# Patient Record
Sex: FEMALE | Race: WHITE | Employment: OTHER | URBAN - METROPOLITAN AREA
[De-identification: names, ages, dates, MRNs, and addresses within clinical notes are randomized per-mention and may not be internally consistent; named-entity substitution may affect disease eponyms.]

---

## 2020-10-12 PROBLEM — D12.6 POLYP OF COLON, ADENOMATOUS: Status: ACTIVE | Noted: 2017-01-02

## 2020-10-12 PROBLEM — L20.89 OTHER ATOPIC DERMATITIS: Status: ACTIVE | Noted: 2019-08-04

## 2020-10-13 ENCOUNTER — OFFICE VISIT (OUTPATIENT)
Dept: UROGYNECOLOGY | Age: 70
End: 2020-10-13
Payer: MEDICARE

## 2020-10-13 VITALS
OXYGEN SATURATION: 98 % | SYSTOLIC BLOOD PRESSURE: 146 MMHG | RESPIRATION RATE: 16 BRPM | TEMPERATURE: 97.1 F | DIASTOLIC BLOOD PRESSURE: 71 MMHG | HEART RATE: 57 BPM

## 2020-10-13 LAB
BILIRUBIN, POC: NORMAL
BLOOD URINE, POC: NORMAL
CLARITY, POC: CLEAR
COLOR, POC: YELLOW
GLUCOSE URINE, POC: NORMAL
KETONES, POC: NORMAL
LEUKOCYTE EST, POC: NORMAL
NITRITE, POC: NORMAL
PH, POC: 6.5
PROTEIN, POC: NORMAL
SPECIFIC GRAVITY, POC: 1.01
UROBILINOGEN, POC: NORMAL

## 2020-10-13 PROCEDURE — G8484 FLU IMMUNIZE NO ADMIN: HCPCS | Performed by: OBSTETRICS & GYNECOLOGY

## 2020-10-13 PROCEDURE — 81002 URINALYSIS NONAUTO W/O SCOPE: CPT | Performed by: OBSTETRICS & GYNECOLOGY

## 2020-10-13 PROCEDURE — G8421 BMI NOT CALCULATED: HCPCS | Performed by: OBSTETRICS & GYNECOLOGY

## 2020-10-13 PROCEDURE — 99204 OFFICE O/P NEW MOD 45 MIN: CPT | Performed by: OBSTETRICS & GYNECOLOGY

## 2020-10-13 PROCEDURE — 0509F URINE INCON PLAN DOCD: CPT | Performed by: OBSTETRICS & GYNECOLOGY

## 2020-10-13 PROCEDURE — 1090F PRES/ABSN URINE INCON ASSESS: CPT | Performed by: OBSTETRICS & GYNECOLOGY

## 2020-10-13 PROCEDURE — G8427 DOCREV CUR MEDS BY ELIG CLIN: HCPCS | Performed by: OBSTETRICS & GYNECOLOGY

## 2020-10-13 PROCEDURE — 51725 SIMPLE CYSTOMETROGRAM: CPT | Performed by: OBSTETRICS & GYNECOLOGY

## 2020-10-13 RX ORDER — LEVOTHYROXINE SODIUM 112 MCG
112 TABLET ORAL DAILY
COMMUNITY
Start: 2020-08-08

## 2020-10-13 RX ORDER — SPIRONOLACTONE 25 MG/1
25 TABLET ORAL 2 TIMES DAILY
COMMUNITY
Start: 2018-12-10 | End: 2021-08-10 | Stop reason: ALTCHOICE

## 2020-10-13 RX ORDER — BIMATOPROST 0.01 %
DROPS OPHTHALMIC (EYE)
COMMUNITY
Start: 2020-09-09

## 2020-10-13 RX ORDER — ESTRADIOL 10 UG/1
10 INSERT VAGINAL SEE ADMIN INSTRUCTIONS
COMMUNITY
Start: 2020-09-23

## 2020-10-13 RX ORDER — ESTRADIOL 0.05 MG/D
1 FILM, EXTENDED RELEASE TRANSDERMAL
COMMUNITY
Start: 2020-09-23

## 2020-10-13 ASSESSMENT — ENCOUNTER SYMPTOMS
CONSTIPATION: 1
SINUS PRESSURE: 1
ABDOMINAL DISTENTION: 1

## 2020-10-13 NOTE — PROGRESS NOTES
10/13/2020      HPI:     Name: Rosalio Ennis  YOB: 1950    CC: Patient is a 78 y.o. female who is seen in consultation from Digna Peguero MD   for evaluation of prolapse, stress incontinence , urge incontinence , constipation and pelvic pain. HPI:  Bladder control problem: yes, 8 months. 1-2 pads a day. 6-8 times to restroom during the day. Bladder emptying problems: No  Prolapse/Vaginal Support problems: yes, bulge. 8 months. Symptoms worse at the end of the day or after standing for prolonged periods. Bowel problem(s): yes, 8 months. Constipation. Bloating. Sometimes feels that bowels are never completely empty. Sexual History:  reports previously being sexually active and has had partner(s) who are Male. Pelvic Pain:  yes, pelvic area and vagina. Heat-stretching relieves the pain. Ob/Gyn History:    OB History   No obstetric history on file. Past Medical History:   Past Medical History:   Diagnosis Date    Anemia     Atopic dermatitis     Cystocele, midline     Dermatitis     Diverticulosis     Endometriosis     Essential hypertension     Female bladder prolapse     Hypertension     Hypothyroidism due to acquired atrophy of thyroid     Patellofemoral disorder of right knee     Polyp of colon, adenomatous     Postmenopausal atrophic vaginitis     Rectocele     SCC (squamous cell carcinoma), face     Skin cancer      Past Surgical History:   Past Surgical History:   Procedure Laterality Date    BUNIONECTOMY Left     EYE SURGERY Bilateral     SLT Laswer eye treatment    HYSTERECTOMY      OSTEOTOMY      FERNANDO AND BSO  1981    FERNANDO, RMVL tube, RMVL ovary with right oophorectomy    TONSILLECTOMY       Allergies:    Allergies   Allergen Reactions    Latex     Adhesive Tape Rash     Medical tape  Medical tape    Amoxicillin Nausea And Vomiting    Corn Oil Rash    Metronidazole Rash    Neomycin Rash    Polymyxin B Rash    Soy Allergy Rash     Current Medications:  Current Outpatient Medications   Medication Sig Dispense Refill    SYNTHROID 112 MCG tablet       estradiol (VIVELLE) 0.05 MG/24HR       Estradiol (VAGIFEM) 10 MCG TABS vaginal tablet       LUMIGAN 0.01 % SOLN ophthalmic drops INSTILL 1 DROP INTO BOTH EYES AT BEDTIME      spironolactone (ALDACTONE) 25 MG tablet Take 25 mg by mouth 2 times daily       No current facility-administered medications for this visit.       Social History:   Social History     Socioeconomic History    Marital status:      Spouse name: Not on file    Number of children: Not on file    Years of education: Not on file    Highest education level: Not on file   Occupational History    Occupation: retired    Social Needs    Financial resource strain: Not on file    Food insecurity     Worry: Not on file     Inability: Not on file   [employer] needs     Medical: Not on file     Non-medical: Not on file   Tobacco Use    Smoking status: Former Smoker     Last attempt to quit: 1980     Years since quittin.8    Smokeless tobacco: Never Used   Substance and Sexual Activity    Alcohol use: Yes     Comment: occaionally    Drug use: Never    Sexual activity: Not Currently     Partners: Male     Comment: due to prolapse and pain   Lifestyle    Physical activity     Days per week: Not on file     Minutes per session: Not on file    Stress: Not on file   Relationships    Social connections     Talks on phone: Not on file     Gets together: Not on file     Attends Sikh service: Not on file     Active member of club or organization: Not on file     Attends meetings of clubs or organizations: Not on file     Relationship status: Not on file    Intimate partner violence     Fear of current or ex partner: Not on file     Emotionally abused: Not on file     Physically abused: Not on file     Forced sexual activity: Not on file   Other Topics Concern    Not on file   Social History Narrative    Not on file     Family History:   Family History   Problem Relation Age of Onset    Colon Cancer Other     Stroke Other      Review of System  Review of Systems   Constitutional: Positive for activity change and fatigue. HENT: Positive for congestion, sinus pressure and tinnitus. Gastrointestinal: Positive for abdominal distention and constipation. Genitourinary: Positive for pelvic pain. Musculoskeletal: Positive for neck stiffness. Allergic/Immunologic: Positive for environmental allergies and food allergies. Objective:     Vital Signs  Vitals:    10/13/20 1033   BP: (!) 146/71   Pulse: 57   Resp: 16   Temp: 97.1 °F (36.2 °C)   SpO2: 98%     Physical Exam  Physical Exam  HENT:      Head: Normocephalic and atraumatic. Eyes:      Conjunctiva/sclera: Conjunctivae normal.   Neck:      Musculoskeletal: Normal range of motion and neck supple. Pulmonary:      Effort: Pulmonary effort is normal.   Abdominal:      Palpations: Abdomen is soft. Genitourinary:     Comments: Rectocele, cystocele, vaginal atrophy  Skin:     General: Skin is warm and dry. Neurological:      Mental Status: She is alert and oriented to person, place, and time. Office Fill Study/Urine Dip:     Using sterile technique a manometry catheter was placed. Patient's bladder was filled with sterile water by gravity. Capacity and storage volumes were measured. Spasms assessed. Catheter was removed. Stress urinary incontinence was assessed.     Results for POC orders placed in visit on 10/13/20   POCT Urinalysis no Micro   Result Value Ref Range    Color, UA yellow     Clarity, UA clear     Glucose, UA POC neg     Bilirubin, UA neg     Ketones, UA neg     Spec Grav, UA 1.015     Blood, UA POC neg     pH, UA 6.5     Protein, UA POC neg     Urobilinogen, UA neg     Leukocytes, UA neg     Nitrite, UA neg        PVR: 20 cc  1st Sensation: 30 cc  2nd Sensation: 150 cc  Max Sensation: 330 cc  Empty Cough Stress Test: negative  Full Cough Stress Test: positive  Spasms: No    POPQ and Pelvic Exam:     Anterior Wall (Aa): -1 Anterior Wall (Ba): -1 Cervix or Cuff (C): -6   Genital Haitus (gh): 4 Perineal body (pb): 2 Total Vaginal Length (tvl): 8   Posterior Wall (Ap): 0 Posterior Wall (Bp): 0      OXFORD SCALE MUSCLE STRENGTH=  4   / 5    Assessment/Plan:     Grace Hernandez is a 78 y.o. female with   1. Enuresis    2. Cystocele, midline    3. Rectocele    4. Vaginal atrophy      She has a cystocele and rectocele and significant vaginal atrophy. She has done a good job with her pelvic floor muscle exercises. [place] I did suggest that she try estrogen vaginal cream.  She has tried a pessary before with Dr. Paulette Rubin and this was not successful. I do not think I have any pessaries that would work appropriately for her at this time and I did explain this to her. I did give her handouts on vaginal estrogen pelvic organ prolapse and she is going to follow-up with me in approximately 6 months time. I did review vaginal repairs of her prolapse should she feel the need to move forward with this. She is also had stress incontinence on her test today however this has improved and I think this is probably related to the prolapse and the fact that she has done very well with her with her pelvic floor exercises. Orders Placed This Encounter   Procedures    POCT Urinalysis no Micro    MO SIMPLE CYSTOMETROGRAM     No orders of the defined types were placed in this encounter.       Urvashi Hernandez

## 2020-10-15 ENCOUNTER — TELEPHONE (OUTPATIENT)
Dept: UROGYNECOLOGY | Age: 70
End: 2020-10-15

## 2020-10-15 RX ORDER — CONJUGATED ESTROGENS 0.62 MG/G
CREAM VAGINAL
Qty: 42.5 G | Refills: 3 | Status: SHIPPED | OUTPATIENT
Start: 2020-10-15

## 2021-04-13 ENCOUNTER — OFFICE VISIT (OUTPATIENT)
Dept: UROGYNECOLOGY | Age: 71
End: 2021-04-13
Payer: MEDICARE

## 2021-04-13 VITALS
SYSTOLIC BLOOD PRESSURE: 169 MMHG | HEART RATE: 56 BPM | TEMPERATURE: 96.9 F | RESPIRATION RATE: 18 BRPM | DIASTOLIC BLOOD PRESSURE: 74 MMHG | OXYGEN SATURATION: 98 %

## 2021-04-13 DIAGNOSIS — N95.2 VAGINAL ATROPHY: ICD-10-CM

## 2021-04-13 DIAGNOSIS — N81.11 CYSTOCELE, MIDLINE: ICD-10-CM

## 2021-04-13 DIAGNOSIS — N81.6 RECTOCELE: ICD-10-CM

## 2021-04-13 DIAGNOSIS — R32 ENURESIS: Primary | ICD-10-CM

## 2021-04-13 PROCEDURE — G8427 DOCREV CUR MEDS BY ELIG CLIN: HCPCS | Performed by: OBSTETRICS & GYNECOLOGY

## 2021-04-13 PROCEDURE — 4040F PNEUMOC VAC/ADMIN/RCVD: CPT | Performed by: OBSTETRICS & GYNECOLOGY

## 2021-04-13 PROCEDURE — 99213 OFFICE O/P EST LOW 20 MIN: CPT | Performed by: OBSTETRICS & GYNECOLOGY

## 2021-04-13 PROCEDURE — 1090F PRES/ABSN URINE INCON ASSESS: CPT | Performed by: OBSTETRICS & GYNECOLOGY

## 2021-04-13 PROCEDURE — 3017F COLORECTAL CA SCREEN DOC REV: CPT | Performed by: OBSTETRICS & GYNECOLOGY

## 2021-04-13 PROCEDURE — 1036F TOBACCO NON-USER: CPT | Performed by: OBSTETRICS & GYNECOLOGY

## 2021-04-13 PROCEDURE — 1123F ACP DISCUSS/DSCN MKR DOCD: CPT | Performed by: OBSTETRICS & GYNECOLOGY

## 2021-04-13 PROCEDURE — G8421 BMI NOT CALCULATED: HCPCS | Performed by: OBSTETRICS & GYNECOLOGY

## 2021-04-13 PROCEDURE — G8400 PT W/DXA NO RESULTS DOC: HCPCS | Performed by: OBSTETRICS & GYNECOLOGY

## 2021-04-13 PROCEDURE — 0509F URINE INCON PLAN DOCD: CPT | Performed by: OBSTETRICS & GYNECOLOGY

## 2021-04-13 RX ORDER — KETOCONAZOLE 20 MG/G
CREAM TOPICAL DAILY PRN
COMMUNITY
Start: 2021-01-06

## 2021-04-13 ASSESSMENT — ENCOUNTER SYMPTOMS: BACK PAIN: 1

## 2021-04-13 NOTE — PROGRESS NOTES
4/13/2021       HPI:     Name: Kaylee Jimenez  YOB: 1950    CC: Patient is a 69 y.o. presenting for evaluation of prolapse. HPI: How long have you had this problem? Years  Please rate the severity of your problem: mild  Anything make it better? The creams and pelvic floor exercises help some. Ob/Gyn History:    OB History   No obstetric history on file. Past Medical History:   Past Medical History:   Diagnosis Date    Anemia     Atopic dermatitis     Cystocele, midline     Dermatitis     Diverticulosis     Endometriosis     Essential hypertension     Female bladder prolapse     Hypertension     Hypothyroidism due to acquired atrophy of thyroid     Patellofemoral disorder of right knee     Polyp of colon, adenomatous     Postmenopausal atrophic vaginitis     Rectocele     SCC (squamous cell carcinoma), face     Skin cancer      Past Surgical History:   Past Surgical History:   Procedure Laterality Date    BUNIONECTOMY Left     EYE SURGERY Bilateral     SLT Laswer eye treatment    HYSTERECTOMY      OSTEOTOMY      FERNANDO AND BSO  1981    FERNANDO, RMVL tube, RMVL ovary with right oophorectomy    TONSILLECTOMY       Allergies: Allergies   Allergen Reactions    Latex     Adhesive Tape Rash     Medical tape  Medical tape    Amoxicillin Nausea And Vomiting    Corn Oil Rash    Metronidazole Rash    Neomycin Rash    Polymyxin B Rash    Soy Allergy Rash     Current Medications:  Current Outpatient Medications   Medication Sig Dispense Refill    ketoconazole (NIZORAL) 2 % cream       Ergocalciferol 10 MCG (400 UNIT) TABS Take 1 tablet by mouth      conjugated estrogens (PREMARIN) 0.625 MG/GM vaginal cream Apply pea size manually to the vagina. 2 times per week.  42.5 g 3    SYNTHROID 112 MCG tablet       estradiol (VIVELLE) 0.05 MG/24HR       Estradiol (VAGIFEM) 10 MCG TABS vaginal tablet       LUMIGAN 0.01 % SOLN ophthalmic drops INSTILL 1 DROP INTO BOTH EYES AT BEDTIME  spironolactone (ALDACTONE) 25 MG tablet Take 25 mg by mouth 2 times daily       No current facility-administered medications for this visit. Social History:   Social History     Socioeconomic History    Marital status:      Spouse name: Not on file    Number of children: Not on file    Years of education: Not on file    Highest education level: Not on file   Occupational History    Occupation: retired    Social Needs    Financial resource strain: Not on file    Food insecurity     Worry: Not on file     Inability: Not on file   [employer] needs     Medical: Not on file     Non-medical: Not on file   Tobacco Use    Smoking status: Former Smoker     Quit date: 1980     Years since quittin.3    Smokeless tobacco: Never Used   Substance and Sexual Activity    Alcohol use: Yes     Comment: occaionally    Drug use: Never    Sexual activity: Not Currently     Partners: Male     Comment: due to prolapse and pain   Lifestyle    Physical activity     Days per week: Not on file     Minutes per session: Not on file    Stress: Not on file   Relationships    Social connections     Talks on phone: Not on file     Gets together: Not on file     Attends Congregation service: Not on file     Active member of club or organization: Not on file     Attends meetings of clubs or organizations: Not on file     Relationship status: Not on file    Intimate partner violence     Fear of current or ex partner: Not on file     Emotionally abused: Not on file     Physically abused: Not on file     Forced sexual activity: Not on file   Other Topics Concern    Not on file   Social History Narrative    Not on file     Family History:   Family History   Problem Relation Age of Onset    Colon Cancer Other     Stroke Other      Review of System   Review of Systems   Genitourinary: Positive for pelvic pain. Musculoskeletal: Positive for back pain.    Allergic/Immunologic: Positive for environmental allergies and food allergies. All other systems reviewed and are negative. A review of systems was done by the patient and reviewed by me and scanned into media today. Objective:     Vitals  Vitals:    04/13/21 0957   BP: (!) 169/74   Pulse: 56   Resp: 18   Temp: 96.9 °F (36.1 °C)   SpO2: 98%     Physical Exam  Physical Exam  HENT:      Head: Normocephalic and atraumatic. Eyes:      Conjunctiva/sclera: Conjunctivae normal.   Neck:      Musculoskeletal: Normal range of motion and neck supple. Pulmonary:      Effort: Pulmonary effort is normal.   Abdominal:      Palpations: Abdomen is soft. Genitourinary:     Comments: Slightly larger cystocele and rectocele and vault prolapse  Skin:     General: Skin is warm and dry. Neurological:      Mental Status: She is alert and oriented to person, place, and time. No results found for this visit on 04/13/21. Assessment/Plan:     Deedee Good is a 69 y.o. female with   1. Enuresis    2. Cystocele, midline    3. Rectocele    4. Vaginal atrophy      She is more bothered by the prolapse and wants to move towards surgery. I talked with her about a [facility] today based on her exam.  She will continue to use the estrogen cream.  No orders of the defined types were placed in this encounter. No orders of the defined types were placed in this encounter.       Randa Wright

## 2021-04-13 NOTE — LETTER
[address] [place] [address] Urogynecology  [facility] [address] [facility]  Phone: [phone]  Fax: [phone]    Mallory Pimentel MD        April 13, 2021      Dear Dr Kristofer Jacobsen,    I had the pleasure of seeing Ranjana Mukherjee back in the office today. She is think about having her prolapse fixed surgically and I will have her follow-up with me again in 3 months time. Sincerely,    Nilson Chanel MD   4/13/2021       HPI:     Name: Spencer Garvey  YOB: 1950    CC: Patient is a 69 y.o. presenting for evaluation of prolapse. HPI: How long have you had this problem? Years  Please rate the severity of your problem: mild  Anything make it better? The creams and pelvic floor exercises help some. Ob/Gyn History:    OB History   No obstetric history on file. Past Medical History:   Past Medical History:   Diagnosis Date    Anemia     Atopic dermatitis     Cystocele, midline     Dermatitis     Diverticulosis     Endometriosis     Essential hypertension     Female bladder prolapse     Hypertension     Hypothyroidism due to acquired atrophy of thyroid     Patellofemoral disorder of right knee     Polyp of colon, adenomatous     Postmenopausal atrophic vaginitis     Rectocele     SCC (squamous cell carcinoma), face     Skin cancer      Past Surgical History:   Past Surgical History:   Procedure Laterality Date    BUNIONECTOMY Left     EYE SURGERY Bilateral     SLT Laswer eye treatment    HYSTERECTOMY      OSTEOTOMY      FERNANDO AND BSO  1981    FERNANDO, RMVL tube, RMVL ovary with right oophorectomy    TONSILLECTOMY       Allergies:    Allergies   Allergen Reactions    Latex     Adhesive Tape Rash     Medical tape  Medical tape    Amoxicillin Nausea And Vomiting    Corn Oil Rash    Metronidazole Rash    Neomycin Rash    Polymyxin B Rash    Soy Allergy Rash     Current Medications:  Current Outpatient Medications   Medication Sig Dispense Refill    ketoconazole (NIZORAL) 2 % cream       Ergocalciferol 10 MCG (400 UNIT) TABS Take 1 tablet by mouth      conjugated estrogens (PREMARIN) 0.625 MG/GM vaginal cream Apply pea size manually to the vagina. 2 times per week. 42.5 g 3    SYNTHROID 112 MCG tablet       estradiol (VIVELLE) 0.05 MG/24HR       Estradiol (VAGIFEM) 10 MCG TABS vaginal tablet       LUMIGAN 0.01 % SOLN ophthalmic drops INSTILL 1 DROP INTO BOTH EYES AT BEDTIME      spironolactone (ALDACTONE) 25 MG tablet Take 25 mg by mouth 2 times daily       No current facility-administered medications for this visit.       Social History:   Social History     Socioeconomic History    Marital status:      Spouse name: Not on file    Number of children: Not on file    Years of education: Not on file    Highest education level: Not on file   Occupational History    Occupation: retired    Social Needs    Financial resource strain: Not on file    Food insecurity     Worry: Not on file     Inability: Not on file   [employer] needs     Medical: Not on file     Non-medical: Not on file   Tobacco Use    Smoking status: Former Smoker     Quit date: 1980     Years since quittin.3    Smokeless tobacco: Never Used   Substance and Sexual Activity    Alcohol use: Yes     Comment: occaionally    Drug use: Never    Sexual activity: Not Currently     Partners: Male     Comment: due to prolapse and pain   Lifestyle    Physical activity     Days per week: Not on file     Minutes per session: Not on file    Stress: Not on file   Relationships    Social connections     Talks on phone: Not on file     Gets together: Not on file     Attends [employer] service: Not on file     Active member of club or organization: Not on file     Attends meetings of clubs or organizations: Not on file     Relationship status: Not on file    Intimate partner violence     Fear of current or ex partner: Not on file     Emotionally abused: Not on file     Physically abused: Not on file     Forced sexual activity: Not on file   Other Topics Concern    Not on file   Social History Narrative    Not on file     Family History:   Family History   Problem Relation Age of Onset    Colon Cancer Other     Stroke Other      Review of System   Review of Systems   Genitourinary: Positive for pelvic pain. Musculoskeletal: Positive for back pain. Allergic/Immunologic: Positive for environmental allergies and food allergies. All other systems reviewed and are negative. A review of systems was done by the patient and reviewed by me and scanned into media today. Objective:     Vitals  Vitals:    04/13/21 0957   BP: (!) 169/74   Pulse: 56   Resp: 18   Temp: 96.9 °F (36.1 °C)   SpO2: 98%     Physical Exam  Physical Exam  HENT:      Head: Normocephalic and atraumatic. Eyes:      Conjunctiva/sclera: Conjunctivae normal.   Neck:      Musculoskeletal: Normal range of motion and neck supple. Pulmonary:      Effort: Pulmonary effort is normal.   Abdominal:      Palpations: Abdomen is soft. Genitourinary:     Comments: Slightly larger cystocele and rectocele and vault prolapse  Skin:     General: Skin is warm and dry. Neurological:      Mental Status: She is alert and oriented to person, place, and time. No results found for this visit on 04/13/21. Assessment/Plan:     Layo Ashford is a 69 y.o. female with   1. Enuresis    2. Cystocele, midline    3. Rectocele    4. Vaginal atrophy      She is more bothered by the prolapse and wants to move towards surgery. I talked with her about a [facility] today based on her exam.  She will continue to use the estrogen cream.  No orders of the defined types were placed in this encounter. No orders of the defined types were placed in this encounter.       Alfred Ruano

## 2021-07-13 ENCOUNTER — OFFICE VISIT (OUTPATIENT)
Dept: UROGYNECOLOGY | Age: 71
End: 2021-07-13
Payer: MEDICARE

## 2021-07-13 VITALS
DIASTOLIC BLOOD PRESSURE: 80 MMHG | HEART RATE: 78 BPM | TEMPERATURE: 97.9 F | RESPIRATION RATE: 14 BRPM | OXYGEN SATURATION: 96 % | SYSTOLIC BLOOD PRESSURE: 166 MMHG

## 2021-07-13 DIAGNOSIS — N39.3 STRESS INCONTINENCE: ICD-10-CM

## 2021-07-13 DIAGNOSIS — N81.11 CYSTOCELE, MIDLINE: ICD-10-CM

## 2021-07-13 DIAGNOSIS — N81.6 RECTOCELE: ICD-10-CM

## 2021-07-13 DIAGNOSIS — R32 ENURESIS: Primary | ICD-10-CM

## 2021-07-13 DIAGNOSIS — N95.2 VAGINAL ATROPHY: ICD-10-CM

## 2021-07-13 PROCEDURE — G8400 PT W/DXA NO RESULTS DOC: HCPCS | Performed by: OBSTETRICS & GYNECOLOGY

## 2021-07-13 PROCEDURE — 4040F PNEUMOC VAC/ADMIN/RCVD: CPT | Performed by: OBSTETRICS & GYNECOLOGY

## 2021-07-13 PROCEDURE — 0509F URINE INCON PLAN DOCD: CPT | Performed by: OBSTETRICS & GYNECOLOGY

## 2021-07-13 PROCEDURE — 1036F TOBACCO NON-USER: CPT | Performed by: OBSTETRICS & GYNECOLOGY

## 2021-07-13 PROCEDURE — 1123F ACP DISCUSS/DSCN MKR DOCD: CPT | Performed by: OBSTETRICS & GYNECOLOGY

## 2021-07-13 PROCEDURE — 1090F PRES/ABSN URINE INCON ASSESS: CPT | Performed by: OBSTETRICS & GYNECOLOGY

## 2021-07-13 PROCEDURE — 3017F COLORECTAL CA SCREEN DOC REV: CPT | Performed by: OBSTETRICS & GYNECOLOGY

## 2021-07-13 PROCEDURE — 99214 OFFICE O/P EST MOD 30 MIN: CPT | Performed by: OBSTETRICS & GYNECOLOGY

## 2021-07-13 PROCEDURE — G8427 DOCREV CUR MEDS BY ELIG CLIN: HCPCS | Performed by: OBSTETRICS & GYNECOLOGY

## 2021-07-13 PROCEDURE — 52285 CYSTOSCOPY AND TREATMENT: CPT | Performed by: OBSTETRICS & GYNECOLOGY

## 2021-07-13 PROCEDURE — G8421 BMI NOT CALCULATED: HCPCS | Performed by: OBSTETRICS & GYNECOLOGY

## 2021-07-13 RX ORDER — SODIUM CHLORIDE 0.9 % (FLUSH) 0.9 %
5-40 SYRINGE (ML) INJECTION EVERY 12 HOURS SCHEDULED
Status: CANCELLED | OUTPATIENT
Start: 2021-07-13

## 2021-07-13 RX ORDER — SODIUM CHLORIDE 9 MG/ML
25 INJECTION, SOLUTION INTRAVENOUS PRN
Status: CANCELLED | OUTPATIENT
Start: 2021-07-13

## 2021-07-13 RX ORDER — SODIUM CHLORIDE 0.9 % (FLUSH) 0.9 %
5-40 SYRINGE (ML) INJECTION PRN
Status: CANCELLED | OUTPATIENT
Start: 2021-07-13

## 2021-07-13 NOTE — PROGRESS NOTES
7/13/2021     HPI:     Name: Joan Wilkins  YOB: 1950    CC: Joan Wilkins is a 69 y.o. female presenting for an evaluation of prolapse. HPI: How long have you had this problem? Several months  Please rate the severity of your problem: moderate  Anything make it better? There have been no changes since last visit. She would like to move forward with surgery. Ob/Gyn History:    OB History   No obstetric history on file. Past Medical History:   Past Medical History:   Diagnosis Date    Anemia     Atopic dermatitis     Cystocele, midline     Dermatitis     Diverticulosis     Endometriosis     Essential hypertension     Female bladder prolapse     Hypertension     Hypothyroidism due to acquired atrophy of thyroid     Patellofemoral disorder of right knee     Polyp of colon, adenomatous     Postmenopausal atrophic vaginitis     Rectocele     SCC (squamous cell carcinoma), face     Skin cancer      Past Surgical History:   Past Surgical History:   Procedure Laterality Date    BUNIONECTOMY Left     EYE SURGERY Bilateral     SLT Laswer eye treatment    HYSTERECTOMY      OSTEOTOMY      FERNANDO AND BSO  1981    FERNANDO, RMVL tube, RMVL ovary with right oophorectomy    TONSILLECTOMY       Current Medications:  Current Outpatient Medications   Medication Sig Dispense Refill    ketoconazole (NIZORAL) 2 % cream       Ergocalciferol 10 MCG (400 UNIT) TABS Take 1 tablet by mouth      conjugated estrogens (PREMARIN) 0.625 MG/GM vaginal cream Apply pea size manually to the vagina. 2 times per week. 42.5 g 3    SYNTHROID 112 MCG tablet       estradiol (VIVELLE) 0.05 MG/24HR       Estradiol (VAGIFEM) 10 MCG TABS vaginal tablet       LUMIGAN 0.01 % SOLN ophthalmic drops INSTILL 1 DROP INTO BOTH EYES AT BEDTIME      spironolactone (ALDACTONE) 25 MG tablet Take 25 mg by mouth 2 times daily       No current facility-administered medications for this visit. Allergies:    Allergies Allergen Reactions    Latex     Adhesive Tape Rash     Medical tape  Medical tape    Amoxicillin Nausea And Vomiting    Corn Oil Rash    Metronidazole Rash    Neomycin Rash    Polymyxin B Rash    Soy Allergy Rash     Social History:   Social History     Socioeconomic History    Marital status:      Spouse name: Not on file    Number of children: Not on file    Years of education: Not on file    Highest education level: Not on file   Occupational History    Occupation: retired    Tobacco Use    Smoking status: Former Smoker     Quit date: 1980     Years since quittin.5    Smokeless tobacco: Never Used   Vaping Use    Vaping Use: Never used   Substance and Sexual Activity    Alcohol use: Yes     Comment: occaionally    Drug use: Never    Sexual activity: Not Currently     Partners: Male     Comment: due to prolapse and pain   Other Topics Concern    Not on file   Social History Narrative    Not on file     Social Determinants of Health     Financial Resource Strain:     Difficulty of Paying Living Expenses:    Food Insecurity:     Worried About [employer] in the Last Year:     [address] in the Last Year:    Transportation Needs:     Lack of Transportation (Medical):      Lack of Transportation (Non-Medical):    Physical Activity:     Days of Exercise per Week:     Minutes of Exercise per Session:    Stress:     Feeling of Stress :    Social Connections:     Frequency of Communication with Friends and Family:     Frequency of Social Gatherings with Friends and Family:     Attends [employer] Services:     Active Member of Clubs or Organizations:     Attends Club or Organization Meetings:     Marital Status:    Intimate Partner Violence:     Fear of Current or Ex-Partner:     Emotionally Abused:     Physically Abused:     Sexually Abused:      Family History:   Family History   Problem Relation Age of Onset    Colon Cancer Other     Stroke Other Review of System  Review of Systems   HENT: Positive for tinnitus. Genitourinary: Positive for pelvic pain. Allergic/Immunologic: Positive for environmental allergies and food allergies. All other systems reviewed and are negative. A review of systems was done by the patient and reviewed by me and scanned into media today. Objective:     Vital Signs  Vitals:    07/13/21 0959   BP: (!) 166/80   Pulse: 78   Resp: 14   Temp: 97.9 °F (36.6 °C)   SpO2: 96%      Physical Exam  Physical Exam  HENT:      Head: Normocephalic and atraumatic. Eyes:      Conjunctiva/sclera: Conjunctivae normal.   Pulmonary:      Effort: Pulmonary effort is normal.   Abdominal:      Palpations: Abdomen is soft. Genitourinary:     Comments: Cystocele, rectocele, vault prolapse, enterocele  Musculoskeletal:      Cervical back: Normal range of motion and neck supple. Skin:     General: Skin is warm and dry. Neurological:      Mental Status: She is alert and oriented to person, place, and time. Procedure: The urethral was anesthesized with topical lidocaine jelly and dilated to a #14 Luxembourgish. A 0-degree urethroscope was used to examine the urethra. A 70-degree cystoscope was used to evaluate the bladder. FINDINGS:  1. Urethra was normal  2. Bladder had trabeculations mild  3. Trigone appeared Normal  4. Ureters illustrated bilateral efflux  5. Patient exhibited spasmsd uring the study no    Cough stress test: Bladder filled to 250 mL. Patient did leak with cough stress test.    No results found for this visit on 07/13/21. Assessment/Plan:     Prem Jarrett is a 69 y.o. female with   1. Enuresis    2. Cystocele, midline    3. Rectocele    4. Vaginal atrophy    5. Stress incontinence      The patient was counseled on surgical and non-surgical options. The patient elected to move forward with surgery.   The risks and benefits of surgery including but not limited to bleeding, infection, injury to bowel, bladder, ureter, or other internal organs, transfusion, pain, bowel dysfunction, urinary incontinence, and sexual dysfunction were discussed at length. All questions were answered to the patients satisfaction. The patient elected to undergo bilateral salpingectomy, posterior repair, robotic sacral colpopexy, synthetic mid-urethral sling and cystourethroscopy. The risk and benefits of synthetic material, including mesh, were explained to the patient and all questions were answered. preop labs were ordered  Her  was present for the discussion today. Orders Placed This Encounter   Procedures    Initiate PAT Protocol     Standing Status:   Future     Standing Expiration Date:   9/11/2021    AK CYSTOSCOPY,RX FEMALE URETHRAL SYND     No orders of the defined types were placed in this encounter.       Justin Gupta MD

## 2021-07-16 PROBLEM — M25.519 SHOULDER PAIN: Status: ACTIVE | Noted: 2021-07-16

## 2021-07-16 PROBLEM — R93.89 ABNORMAL X-RAY EXAMINATION: Status: ACTIVE | Noted: 2021-07-16

## 2021-07-16 PROBLEM — Z01.818 PRE-OP TESTING: Status: ACTIVE | Noted: 2021-07-16

## 2021-07-16 PROBLEM — D12.6 ADENOMATOUS POLYP OF COLON: Status: ACTIVE | Noted: 2017-01-02

## 2021-07-16 RX ORDER — CLINDAMYCIN PHOSPHATE 20 MG/G
1 CREAM VAGINAL NIGHTLY
COMMUNITY
Start: 2021-02-12 | End: 2021-08-10 | Stop reason: ALTCHOICE

## 2021-07-16 RX ORDER — CHLORAL HYDRATE 500 MG
1000 CAPSULE ORAL DAILY
COMMUNITY

## 2021-07-16 RX ORDER — FAMOTIDINE 20 MG
1 TABLET ORAL
COMMUNITY

## 2021-07-16 RX ORDER — IVERMECTIN 10 MG/G
CREAM TOPICAL DAILY PRN
COMMUNITY
Start: 2021-05-18

## 2021-07-16 RX ORDER — CLINDAMYCIN PHOSPHATE 20 MG/G
CREAM VAGINAL
COMMUNITY
Start: 2021-05-14 | End: 2021-08-10 | Stop reason: ALTCHOICE

## 2021-07-21 ENCOUNTER — TELEPHONE (OUTPATIENT)
Dept: UROGYNECOLOGY | Age: 71
End: 2021-07-21

## 2021-07-21 NOTE — TELEPHONE ENCOUNTER
Surgery scheduled for 8/16/21. Patient called and  Questions regarding surgery, medications, or post operative care at this time have been answered.

## 2021-08-03 ENCOUNTER — TELEPHONE (OUTPATIENT)
Dept: UROGYNECOLOGY | Age: 71
End: 2021-08-03

## 2021-08-05 ENCOUNTER — TELEPHONE (OUTPATIENT)
Dept: UROGYNECOLOGY | Age: 71
End: 2021-08-05

## 2021-08-10 RX ORDER — PIMECROLIMUS 10 MG/G
CREAM TOPICAL 2 TIMES DAILY
COMMUNITY

## 2021-08-10 NOTE — PROGRESS NOTES
[address] [facility] time____0600________        Surgery time__0730__________    Take the following medications with a sip of water: Follow your MD/Surgeons pre-procedure instructions regarding your medications    Do not eat or drink anything after 12:00 midnight prior to your surgery. This includes water chewing gum, mints and ice chips. You may brush your teeth and gargle the morning of your surgery, but do not swallow the water     Please see your family doctor/pediatrician for a history and physical and/or concerning medications. Bring any test results/reports from your physicians office. If you are under the care of a heart doctor or specialist doctor, please be aware that you may be asked to them for clearance    You may be asked to stop blood thinners such as Coumadin, Plavix, Fragmin, Lovenox, etc., or any anti-inflammatories such as:  Aspirin, Ibuprofen, Advil, Naproxen prior to your surgery. We also ask that you stop any OTC medications such as fish oil, vitamin E, glucosamine, garlic, Multivitamins, COQ 10, etc.    We ask that you do not smoke 24 hours prior to surgery  We ask that you do not  drink any alcoholic beverages 24 hours prior to surgery     You must make arrangements for a responsible adult to take you home after your surgery. For your safety you will not be allowed to leave alone or drive yourself home. Your surgery will be cancelled if you do not have a ride home. Also for your safety, it is strongly suggested that someone stay with you the first 24 hours after your surgery. A parent or legal guardian must accompany a child scheduled for surgery and plan to stay at the hospital until the child is discharged. Please do not bring other children with you. For your comfort, please wear simple loose fitting clothing to the hospital.  Please do not bring valuables.     Do not wear any make-up or nail polish on your fingers or toes      For your safety, please do not wear any jewelry or body piercing's on the day of surgery. All jewelry must be removed. If you have dentures, they will be removed before going to operating room. For your convenience, we will provide you with a container. If you wear contact lenses or glasses, they will be removed, please bring a case for them. If you have a living will and a durable power of  for healthcare, please bring in a copy. As part of our patient safety program to minimize surgical site infections, we ask you to do the following:    · Please notify your surgeon if you develop any illness between         now and the  day of your surgery. · This includes a cough, cold, fever, sore throat, nausea,         or vomiting, and diarrhea, etc.  ·  Please notify your surgeon if you experience dizziness, shortness         of breath or blurred vision between now and the time of your surgery. Do not shave your operative site 96 hours prior to surgery. For face and neck surgery, men may use an electric razor 48 hours   prior to surgery. You may shower the night before surgery or the morning of   your surgery with an antibacterial soap. You will need to bring a photo ID and insurance card    [facility] has an onsite pharmacy, would you like to utilize our pharmacy     If you will be staying overnight and use a C-pap machine, please bring   your C-pap to hospital     Our goal is to provide you with excellent care, therefore, visitors will be limited to two(2) in the room at a time so that we may focus on providing this care for you. Please contact pre-admission testing if you have any further questions. [facility] phone number:  [address] [facility] fax number:  [phone]  Please note these are generalized instructions for all surgical cases, you may be provided with more specific instructions according to your surgery.

## 2021-08-10 NOTE — PROGRESS NOTES
Phone message left to call PAT dept at [phone]  for history review and surgery instructions on 8/10/21 @ 1982

## 2021-08-10 NOTE — PROGRESS NOTES
C-Difficile admission screening and protocol:     * Admitted with diarrhea? YES____    NO___X__     *Prior history of C-Diff. In last 3 months? YES____   NO_X____     *Antibiotic use in the past 6-8 weeks? NO______YES__X____                 If yes which  ANTIBIOTIC AND REASON______     *Prior hospitalization or nursing home in the last month?  YES____   NO__X__

## 2021-08-10 NOTE — PROGRESS NOTES
Preoperative Screening for Elective Surgery/Invasive Procedures While COVID-19 present in the community     Have you tested positive or have been t old to self-isolate for COVID-19 like symptoms within the past 28 days? Pt to bring covid vaccine card DOS   Do you currently have any of the following symptoms? NO  o Fever >100.0 F or 99.9 F in immunocompromised patients? o New onset cough, shortness of breath or difficulty breathing?  o New onset sore throat, myalgia (muscle aches and pains), headache, loss of taste/smell or diarrhea?  Have you had a potential exposure to COVID-19 within the past 14 days by: NO  o Close contact with a confirmed case? o Close contact with a healthcare worker,  or essential infrastructure worker (grocery store, [employer], gas station, public utilities or transportation)? o Do you reside in a congregate setting such as; skilled nursing facility, adult home, correctional facility, homeless shelter or other institutional setting?  o Have you had recent travel to a known COVID-19 hotspot? Indicate if the patient has a positive screen by answering yes to one or more of the above questions. Patients who test positive or screen positive prior to surgery or on the day of surgery should be evaluated in conjunction with the surgeon/proceduralist/anesthesiologist to determine the urgency of the procedure.

## 2021-08-13 ENCOUNTER — ANESTHESIA EVENT (OUTPATIENT)
Dept: OPERATING ROOM | Age: 71
DRG: 747 | End: 2021-08-13
Payer: MEDICARE

## 2021-08-13 ENCOUNTER — TELEPHONE (OUTPATIENT)
Dept: UROGYNECOLOGY | Age: 71
End: 2021-08-13

## 2021-08-15 PROBLEM — Z01.818 PRE-OP TESTING: Status: RESOLVED | Noted: 2021-07-16 | Resolved: 2021-08-15

## 2021-08-16 ENCOUNTER — ANESTHESIA (OUTPATIENT)
Dept: OPERATING ROOM | Age: 71
DRG: 747 | End: 2021-08-16
Payer: MEDICARE

## 2021-08-16 ENCOUNTER — HOSPITAL ENCOUNTER (INPATIENT)
Age: 71
LOS: 1 days | Discharge: HOME | DRG: 747 | End: 2021-08-17
Attending: OBSTETRICS & GYNECOLOGY | Admitting: OBSTETRICS & GYNECOLOGY
Payer: MEDICARE

## 2021-08-16 VITALS
DIASTOLIC BLOOD PRESSURE: 71 MMHG | OXYGEN SATURATION: 97 % | RESPIRATION RATE: 5 BRPM | TEMPERATURE: 95.4 F | SYSTOLIC BLOOD PRESSURE: 163 MMHG

## 2021-08-16 DIAGNOSIS — G89.18 POSTOPERATIVE PAIN: Primary | ICD-10-CM

## 2021-08-16 PROBLEM — N81.9 VAGINAL VAULT PROLAPSE: Status: ACTIVE | Noted: 2021-08-16

## 2021-08-16 PROCEDURE — 2709999900 HC NON-CHARGEABLE SUPPLY: Performed by: OBSTETRICS & GYNECOLOGY

## 2021-08-16 PROCEDURE — C1781 MESH (IMPLANTABLE): HCPCS | Performed by: OBSTETRICS & GYNECOLOGY

## 2021-08-16 PROCEDURE — 2500000003 HC RX 250 WO HCPCS

## 2021-08-16 PROCEDURE — 6360000002 HC RX W HCPCS: Performed by: OBSTETRICS & GYNECOLOGY

## 2021-08-16 PROCEDURE — 3700000001 HC ADD 15 MINUTES (ANESTHESIA): Performed by: OBSTETRICS & GYNECOLOGY

## 2021-08-16 PROCEDURE — 3600000009 HC SURGERY ROBOT BASE: Performed by: OBSTETRICS & GYNECOLOGY

## 2021-08-16 PROCEDURE — 2580000003 HC RX 258: Performed by: ANESTHESIOLOGY

## 2021-08-16 PROCEDURE — 6360000002 HC RX W HCPCS: Performed by: ANESTHESIOLOGY

## 2021-08-16 PROCEDURE — 6370000000 HC RX 637 (ALT 250 FOR IP): Performed by: OBSTETRICS & GYNECOLOGY

## 2021-08-16 PROCEDURE — 7100000000 HC PACU RECOVERY - FIRST 15 MIN: Performed by: OBSTETRICS & GYNECOLOGY

## 2021-08-16 PROCEDURE — 94150 VITAL CAPACITY TEST: CPT

## 2021-08-16 PROCEDURE — 58999 UNLISTED PX FML GENITAL SYS: CPT | Performed by: OBSTETRICS & GYNECOLOGY

## 2021-08-16 PROCEDURE — 0TQD8ZZ REPAIR URETHRA, VIA NATURAL OR ARTIFICIAL OPENING ENDOSCOPIC: ICD-10-PCS | Performed by: OBSTETRICS & GYNECOLOGY

## 2021-08-16 PROCEDURE — 3700000000 HC ANESTHESIA ATTENDED CARE: Performed by: OBSTETRICS & GYNECOLOGY

## 2021-08-16 PROCEDURE — 2580000003 HC RX 258: Performed by: OBSTETRICS & GYNECOLOGY

## 2021-08-16 PROCEDURE — 6360000002 HC RX W HCPCS: Performed by: NURSE ANESTHETIST, CERTIFIED REGISTERED

## 2021-08-16 PROCEDURE — 2700000000 HC OXYGEN THERAPY PER DAY

## 2021-08-16 PROCEDURE — 57425 LAPAROSCOPY SURG COLPOPEXY: CPT | Performed by: OBSTETRICS & GYNECOLOGY

## 2021-08-16 PROCEDURE — S2900 ROBOTIC SURGICAL SYSTEM: HCPCS | Performed by: OBSTETRICS & GYNECOLOGY

## 2021-08-16 PROCEDURE — 8E0W4CZ ROBOTIC ASSISTED PROCEDURE OF TRUNK REGION, PERCUTANEOUS ENDOSCOPIC APPROACH: ICD-10-PCS | Performed by: OBSTETRICS & GYNECOLOGY

## 2021-08-16 PROCEDURE — 2500000003 HC RX 250 WO HCPCS: Performed by: OBSTETRICS & GYNECOLOGY

## 2021-08-16 PROCEDURE — 2500000003 HC RX 250 WO HCPCS: Performed by: NURSE ANESTHETIST, CERTIFIED REGISTERED

## 2021-08-16 PROCEDURE — 0USG4ZZ REPOSITION VAGINA, PERCUTANEOUS ENDOSCOPIC APPROACH: ICD-10-PCS | Performed by: OBSTETRICS & GYNECOLOGY

## 2021-08-16 PROCEDURE — 0TSD4ZZ REPOSITION URETHRA, PERCUTANEOUS ENDOSCOPIC APPROACH: ICD-10-PCS | Performed by: OBSTETRICS & GYNECOLOGY

## 2021-08-16 PROCEDURE — 0UQF4ZZ REPAIR CUL-DE-SAC, PERCUTANEOUS ENDOSCOPIC APPROACH: ICD-10-PCS | Performed by: OBSTETRICS & GYNECOLOGY

## 2021-08-16 PROCEDURE — 7100000001 HC PACU RECOVERY - ADDTL 15 MIN: Performed by: OBSTETRICS & GYNECOLOGY

## 2021-08-16 PROCEDURE — C1771 REP DEV, URINARY, W/SLING: HCPCS | Performed by: OBSTETRICS & GYNECOLOGY

## 2021-08-16 PROCEDURE — 2720000010 HC SURG SUPPLY STERILE: Performed by: OBSTETRICS & GYNECOLOGY

## 2021-08-16 PROCEDURE — 57260 CMBN ANT PST COLPRHY: CPT | Performed by: OBSTETRICS & GYNECOLOGY

## 2021-08-16 PROCEDURE — 3600000019 HC SURGERY ROBOT ADDTL 15MIN: Performed by: OBSTETRICS & GYNECOLOGY

## 2021-08-16 PROCEDURE — 0JQC0ZZ REPAIR PELVIC REGION SUBCUTANEOUS TISSUE AND FASCIA, OPEN APPROACH: ICD-10-PCS | Performed by: OBSTETRICS & GYNECOLOGY

## 2021-08-16 PROCEDURE — 0WQN0ZZ REPAIR FEMALE PERINEUM, OPEN APPROACH: ICD-10-PCS | Performed by: OBSTETRICS & GYNECOLOGY

## 2021-08-16 PROCEDURE — 94761 N-INVAS EAR/PLS OXIMETRY MLT: CPT

## 2021-08-16 PROCEDURE — 1200000000 HC SEMI PRIVATE

## 2021-08-16 PROCEDURE — 57288 REPAIR BLADDER DEFECT: CPT | Performed by: OBSTETRICS & GYNECOLOGY

## 2021-08-16 DEVICE — SUPRAPUBIC MID-URETHRAL SLING
Type: IMPLANTABLE DEVICE | Site: VAGINA | Status: FUNCTIONAL
Brand: LYNX™ BLUE SYSTEM

## 2021-08-16 DEVICE — POLYPROPYLENE MESH FOR SACROCOLPOSUSPENSION/SACROCOLPOPEXY - M
Type: IMPLANTABLE DEVICE | Site: ABDOMEN | Status: FUNCTIONAL
Brand: RESTORELLE

## 2021-08-16 RX ORDER — GLYCOPYRROLATE 0.2 MG/ML
INJECTION INTRAMUSCULAR; INTRAVENOUS PRN
Status: DISCONTINUED | OUTPATIENT
Start: 2021-08-16 | End: 2021-08-16 | Stop reason: SDUPTHER

## 2021-08-16 RX ORDER — OXYCODONE HYDROCHLORIDE AND ACETAMINOPHEN 5; 325 MG/1; MG/1
2 TABLET ORAL PRN
Status: DISCONTINUED | OUTPATIENT
Start: 2021-08-16 | End: 2021-08-16 | Stop reason: HOSPADM

## 2021-08-16 RX ORDER — OXYCODONE HYDROCHLORIDE AND ACETAMINOPHEN 5; 325 MG/1; MG/1
1 TABLET ORAL PRN
Status: DISCONTINUED | OUTPATIENT
Start: 2021-08-16 | End: 2021-08-16 | Stop reason: HOSPADM

## 2021-08-16 RX ORDER — ONDANSETRON 2 MG/ML
4 INJECTION INTRAMUSCULAR; INTRAVENOUS
Status: COMPLETED | OUTPATIENT
Start: 2021-08-16 | End: 2021-08-16

## 2021-08-16 RX ORDER — KETOROLAC TROMETHAMINE 15 MG/ML
15 INJECTION, SOLUTION INTRAMUSCULAR; INTRAVENOUS EVERY 6 HOURS
Status: COMPLETED | OUTPATIENT
Start: 2021-08-16 | End: 2021-08-17

## 2021-08-16 RX ORDER — ONDANSETRON 2 MG/ML
4 INJECTION INTRAMUSCULAR; INTRAVENOUS EVERY 6 HOURS PRN
Status: DISCONTINUED | OUTPATIENT
Start: 2021-08-16 | End: 2021-08-17 | Stop reason: HOSPADM

## 2021-08-16 RX ORDER — SODIUM CHLORIDE 9 MG/ML
25 INJECTION, SOLUTION INTRAVENOUS PRN
Status: DISCONTINUED | OUTPATIENT
Start: 2021-08-16 | End: 2021-08-16 | Stop reason: SDUPTHER

## 2021-08-16 RX ORDER — OXYCODONE HYDROCHLORIDE AND ACETAMINOPHEN 5; 325 MG/1; MG/1
2 TABLET ORAL EVERY 4 HOURS PRN
Status: DISCONTINUED | OUTPATIENT
Start: 2021-08-16 | End: 2021-08-17 | Stop reason: HOSPADM

## 2021-08-16 RX ORDER — SODIUM CHLORIDE 0.9 % (FLUSH) 0.9 %
5-40 SYRINGE (ML) INJECTION PRN
Status: DISCONTINUED | OUTPATIENT
Start: 2021-08-16 | End: 2021-08-16 | Stop reason: SDUPTHER

## 2021-08-16 RX ORDER — SODIUM CHLORIDE 9 MG/ML
INJECTION, SOLUTION INTRAVENOUS CONTINUOUS
Status: DISCONTINUED | OUTPATIENT
Start: 2021-08-16 | End: 2021-08-17 | Stop reason: HOSPADM

## 2021-08-16 RX ORDER — PROMETHAZINE HYDROCHLORIDE 25 MG/1
12.5 TABLET ORAL EVERY 6 HOURS PRN
Status: DISCONTINUED | OUTPATIENT
Start: 2021-08-16 | End: 2021-08-17 | Stop reason: HOSPADM

## 2021-08-16 RX ORDER — SODIUM CHLORIDE 9 MG/ML
INJECTION, SOLUTION INTRAVENOUS CONTINUOUS
Status: DISCONTINUED | OUTPATIENT
Start: 2021-08-16 | End: 2021-08-16

## 2021-08-16 RX ORDER — MORPHINE SULFATE 2 MG/ML
1 INJECTION, SOLUTION INTRAMUSCULAR; INTRAVENOUS EVERY 5 MIN PRN
Status: DISCONTINUED | OUTPATIENT
Start: 2021-08-16 | End: 2021-08-16 | Stop reason: HOSPADM

## 2021-08-16 RX ORDER — BUPIVACAINE HYDROCHLORIDE 5 MG/ML
INJECTION, SOLUTION EPIDURAL; INTRACAUDAL
Status: COMPLETED | OUTPATIENT
Start: 2021-08-16 | End: 2021-08-16

## 2021-08-16 RX ORDER — SODIUM CHLORIDE 9 MG/ML
25 INJECTION, SOLUTION INTRAVENOUS PRN
Status: DISCONTINUED | OUTPATIENT
Start: 2021-08-16 | End: 2021-08-16 | Stop reason: HOSPADM

## 2021-08-16 RX ORDER — LATANOPROST 50 UG/ML
1 SOLUTION/ DROPS OPHTHALMIC NIGHTLY
Status: DISCONTINUED | OUTPATIENT
Start: 2021-08-16 | End: 2021-08-17 | Stop reason: HOSPADM

## 2021-08-16 RX ORDER — SODIUM CHLORIDE 0.9 % (FLUSH) 0.9 %
5-40 SYRINGE (ML) INJECTION EVERY 12 HOURS SCHEDULED
Status: DISCONTINUED | OUTPATIENT
Start: 2021-08-16 | End: 2021-08-17 | Stop reason: HOSPADM

## 2021-08-16 RX ORDER — LIDOCAINE HYDROCHLORIDE 20 MG/ML
INJECTION, SOLUTION EPIDURAL; INFILTRATION; INTRACAUDAL; PERINEURAL PRN
Status: DISCONTINUED | OUTPATIENT
Start: 2021-08-16 | End: 2021-08-16 | Stop reason: SDUPTHER

## 2021-08-16 RX ORDER — SODIUM CHLORIDE 0.9 % (FLUSH) 0.9 %
5-40 SYRINGE (ML) INJECTION EVERY 12 HOURS SCHEDULED
Status: DISCONTINUED | OUTPATIENT
Start: 2021-08-16 | End: 2021-08-16 | Stop reason: SDUPTHER

## 2021-08-16 RX ORDER — MORPHINE SULFATE 2 MG/ML
2 INJECTION, SOLUTION INTRAMUSCULAR; INTRAVENOUS EVERY 5 MIN PRN
Status: DISCONTINUED | OUTPATIENT
Start: 2021-08-16 | End: 2021-08-16 | Stop reason: HOSPADM

## 2021-08-16 RX ORDER — ONDANSETRON 2 MG/ML
INJECTION INTRAMUSCULAR; INTRAVENOUS PRN
Status: DISCONTINUED | OUTPATIENT
Start: 2021-08-16 | End: 2021-08-16 | Stop reason: SDUPTHER

## 2021-08-16 RX ORDER — SODIUM CHLORIDE 9 MG/ML
25 INJECTION, SOLUTION INTRAVENOUS PRN
Status: DISCONTINUED | OUTPATIENT
Start: 2021-08-16 | End: 2021-08-17 | Stop reason: HOSPADM

## 2021-08-16 RX ORDER — SODIUM CHLORIDE 0.9 % (FLUSH) 0.9 %
10 SYRINGE (ML) INJECTION PRN
Status: DISCONTINUED | OUTPATIENT
Start: 2021-08-16 | End: 2021-08-16 | Stop reason: HOSPADM

## 2021-08-16 RX ORDER — HYDRALAZINE HYDROCHLORIDE 20 MG/ML
5 INJECTION INTRAMUSCULAR; INTRAVENOUS
Status: DISCONTINUED | OUTPATIENT
Start: 2021-08-16 | End: 2021-08-16 | Stop reason: HOSPADM

## 2021-08-16 RX ORDER — MEPERIDINE HYDROCHLORIDE 25 MG/ML
12.5 INJECTION INTRAMUSCULAR; INTRAVENOUS; SUBCUTANEOUS EVERY 5 MIN PRN
Status: DISCONTINUED | OUTPATIENT
Start: 2021-08-16 | End: 2021-08-16 | Stop reason: HOSPADM

## 2021-08-16 RX ORDER — DEXAMETHASONE SODIUM PHOSPHATE 4 MG/ML
INJECTION, SOLUTION INTRA-ARTICULAR; INTRALESIONAL; INTRAMUSCULAR; INTRAVENOUS; SOFT TISSUE PRN
Status: DISCONTINUED | OUTPATIENT
Start: 2021-08-16 | End: 2021-08-16 | Stop reason: SDUPTHER

## 2021-08-16 RX ORDER — LEVOTHYROXINE SODIUM 112 UG/1
112 TABLET ORAL DAILY
Status: DISCONTINUED | OUTPATIENT
Start: 2021-08-16 | End: 2021-08-17 | Stop reason: HOSPADM

## 2021-08-16 RX ORDER — ROCURONIUM BROMIDE 10 MG/ML
INJECTION, SOLUTION INTRAVENOUS PRN
Status: DISCONTINUED | OUTPATIENT
Start: 2021-08-16 | End: 2021-08-16 | Stop reason: SDUPTHER

## 2021-08-16 RX ORDER — OXYCODONE HYDROCHLORIDE AND ACETAMINOPHEN 5; 325 MG/1; MG/1
1 TABLET ORAL EVERY 4 HOURS PRN
Status: DISCONTINUED | OUTPATIENT
Start: 2021-08-16 | End: 2021-08-17 | Stop reason: HOSPADM

## 2021-08-16 RX ORDER — FENTANYL CITRATE 50 UG/ML
INJECTION, SOLUTION INTRAMUSCULAR; INTRAVENOUS PRN
Status: DISCONTINUED | OUTPATIENT
Start: 2021-08-16 | End: 2021-08-16 | Stop reason: SDUPTHER

## 2021-08-16 RX ORDER — SODIUM CHLORIDE 0.9 % (FLUSH) 0.9 %
10 SYRINGE (ML) INJECTION EVERY 12 HOURS SCHEDULED
Status: DISCONTINUED | OUTPATIENT
Start: 2021-08-16 | End: 2021-08-16 | Stop reason: HOSPADM

## 2021-08-16 RX ORDER — MAGNESIUM HYDROXIDE 1200 MG/15ML
LIQUID ORAL CONTINUOUS PRN
Status: COMPLETED | OUTPATIENT
Start: 2021-08-16 | End: 2021-08-16

## 2021-08-16 RX ORDER — MIDAZOLAM HYDROCHLORIDE 1 MG/ML
INJECTION INTRAMUSCULAR; INTRAVENOUS PRN
Status: DISCONTINUED | OUTPATIENT
Start: 2021-08-16 | End: 2021-08-16 | Stop reason: SDUPTHER

## 2021-08-16 RX ORDER — PROPOFOL 10 MG/ML
INJECTION, EMULSION INTRAVENOUS PRN
Status: DISCONTINUED | OUTPATIENT
Start: 2021-08-16 | End: 2021-08-16 | Stop reason: SDUPTHER

## 2021-08-16 RX ORDER — LABETALOL HYDROCHLORIDE 5 MG/ML
5 INJECTION, SOLUTION INTRAVENOUS EVERY 10 MIN PRN
Status: DISCONTINUED | OUTPATIENT
Start: 2021-08-16 | End: 2021-08-16 | Stop reason: HOSPADM

## 2021-08-16 RX ORDER — SODIUM CHLORIDE 0.9 % (FLUSH) 0.9 %
5-40 SYRINGE (ML) INJECTION PRN
Status: DISCONTINUED | OUTPATIENT
Start: 2021-08-16 | End: 2021-08-17 | Stop reason: HOSPADM

## 2021-08-16 RX ADMIN — SODIUM CHLORIDE: 9 INJECTION, SOLUTION INTRAVENOUS at 10:01

## 2021-08-16 RX ADMIN — FLUORESCEIN SODIUM 10 MG: 100 INJECTION INTRAVENOUS at 09:18

## 2021-08-16 RX ADMIN — ENOXAPARIN SODIUM 40 MG: 40 INJECTION SUBCUTANEOUS at 06:36

## 2021-08-16 RX ADMIN — LEVOTHYROXINE SODIUM 112 MCG: 0.11 TABLET ORAL at 13:11

## 2021-08-16 RX ADMIN — KETOROLAC TROMETHAMINE 15 MG: 15 INJECTION, SOLUTION INTRAMUSCULAR; INTRAVENOUS at 18:19

## 2021-08-16 RX ADMIN — LIDOCAINE HYDROCHLORIDE 100 MG: 20 INJECTION, SOLUTION EPIDURAL; INFILTRATION; INTRACAUDAL; PERINEURAL at 07:38

## 2021-08-16 RX ADMIN — SODIUM CHLORIDE: 9 INJECTION, SOLUTION INTRAVENOUS at 13:19

## 2021-08-16 RX ADMIN — PROPOFOL 150 MG: 10 INJECTION, EMULSION INTRAVENOUS at 07:38

## 2021-08-16 RX ADMIN — HYDROMORPHONE HYDROCHLORIDE 0.75 MG: 1 INJECTION, SOLUTION INTRAMUSCULAR; INTRAVENOUS; SUBCUTANEOUS at 09:38

## 2021-08-16 RX ADMIN — MIDAZOLAM 2 MG: 1 INJECTION INTRAMUSCULAR; INTRAVENOUS at 07:34

## 2021-08-16 RX ADMIN — KETOROLAC TROMETHAMINE 15 MG: 15 INJECTION, SOLUTION INTRAMUSCULAR; INTRAVENOUS at 13:11

## 2021-08-16 RX ADMIN — ONDANSETRON 4 MG: 2 INJECTION INTRAMUSCULAR; INTRAVENOUS at 09:47

## 2021-08-16 RX ADMIN — ROCURONIUM BROMIDE 50 MG: 10 INJECTION INTRAVENOUS at 07:38

## 2021-08-16 RX ADMIN — OXYCODONE HYDROCHLORIDE AND ACETAMINOPHEN 1 TABLET: 5; 325 TABLET ORAL at 15:31

## 2021-08-16 RX ADMIN — CEFAZOLIN SODIUM 2000 MG: 10 INJECTION, POWDER, FOR SOLUTION INTRAVENOUS at 07:34

## 2021-08-16 RX ADMIN — DEXAMETHASONE SODIUM PHOSPHATE 8 MG: 4 INJECTION, SOLUTION INTRAMUSCULAR; INTRAVENOUS at 09:08

## 2021-08-16 RX ADMIN — ROCURONIUM BROMIDE 10 MG: 10 INJECTION INTRAVENOUS at 08:38

## 2021-08-16 RX ADMIN — HYDROMORPHONE HYDROCHLORIDE 0.25 MG: 1 INJECTION, SOLUTION INTRAMUSCULAR; INTRAVENOUS; SUBCUTANEOUS at 08:45

## 2021-08-16 RX ADMIN — GLYCOPYRROLATE 0.2 MG: 0.2 INJECTION, SOLUTION INTRAMUSCULAR; INTRAVENOUS at 07:56

## 2021-08-16 RX ADMIN — FENTANYL CITRATE 100 MCG: 50 INJECTION INTRAMUSCULAR; INTRAVENOUS at 07:36

## 2021-08-16 RX ADMIN — SODIUM CHLORIDE: 9 INJECTION, SOLUTION INTRAVENOUS at 06:34

## 2021-08-16 RX ADMIN — HYDROMORPHONE HYDROCHLORIDE 0.25 MG: 1 INJECTION, SOLUTION INTRAMUSCULAR; INTRAVENOUS; SUBCUTANEOUS at 10:55

## 2021-08-16 RX ADMIN — ONDANSETRON 4 MG: 2 INJECTION INTRAMUSCULAR; INTRAVENOUS at 10:58

## 2021-08-16 RX ADMIN — SUGAMMADEX 200 MG: 100 INJECTION, SOLUTION INTRAVENOUS at 09:47

## 2021-08-16 RX ADMIN — SODIUM CHLORIDE: 9 INJECTION, SOLUTION INTRAVENOUS at 20:35

## 2021-08-16 RX ADMIN — LATANOPROST 1 DROP: 50 SOLUTION OPHTHALMIC at 20:28

## 2021-08-16 RX ADMIN — Medication 10 ML: at 20:26

## 2021-08-16 RX ADMIN — OXYCODONE HYDROCHLORIDE AND ACETAMINOPHEN 1 TABLET: 5; 325 TABLET ORAL at 20:25

## 2021-08-16 ASSESSMENT — PULMONARY FUNCTION TESTS
PIF_VALUE: 24
PIF_VALUE: 25
PIF_VALUE: 18
PIF_VALUE: 24
PIF_VALUE: 15
PIF_VALUE: 15
PIF_VALUE: 24
PIF_VALUE: 16
PIF_VALUE: 16
PIF_VALUE: 15
PIF_VALUE: 24
PIF_VALUE: 14
PIF_VALUE: 14
PIF_VALUE: 24
PIF_VALUE: 9
PIF_VALUE: 24
PIF_VALUE: 16
PIF_VALUE: 24
PIF_VALUE: 24
PIF_VALUE: 15
PIF_VALUE: 6
PIF_VALUE: 24
PIF_VALUE: 24
PIF_VALUE: 18
PIF_VALUE: 24
PIF_VALUE: 1
PIF_VALUE: 0
PIF_VALUE: 22
PIF_VALUE: 12
PIF_VALUE: 24
PIF_VALUE: 0
PIF_VALUE: 15
PIF_VALUE: 24
PIF_VALUE: 2
PIF_VALUE: 15
PIF_VALUE: 24
PIF_VALUE: 14
PIF_VALUE: 16
PIF_VALUE: 24
PIF_VALUE: 10
PIF_VALUE: 24
PIF_VALUE: 14
PIF_VALUE: 0
PIF_VALUE: 18
PIF_VALUE: 16
PIF_VALUE: 15
PIF_VALUE: 12
PIF_VALUE: 13
PIF_VALUE: 24
PIF_VALUE: 24
PIF_VALUE: 14
PIF_VALUE: 24
PIF_VALUE: 24
PIF_VALUE: 18
PIF_VALUE: 24
PIF_VALUE: 24
PIF_VALUE: 22
PIF_VALUE: 13
PIF_VALUE: 15
PIF_VALUE: 14
PIF_VALUE: 20
PIF_VALUE: 24
PIF_VALUE: 15
PIF_VALUE: 1
PIF_VALUE: 24
PIF_VALUE: 24
PIF_VALUE: 13
PIF_VALUE: 16
PIF_VALUE: 24
PIF_VALUE: 24
PIF_VALUE: 14
PIF_VALUE: 24
PIF_VALUE: 1
PIF_VALUE: 25
PIF_VALUE: 1
PIF_VALUE: 1
PIF_VALUE: 24
PIF_VALUE: 14
PIF_VALUE: 14
PIF_VALUE: 24
PIF_VALUE: 5
PIF_VALUE: 16
PIF_VALUE: 24
PIF_VALUE: 19
PIF_VALUE: 16
PIF_VALUE: 12
PIF_VALUE: 6
PIF_VALUE: 25
PIF_VALUE: 24
PIF_VALUE: 24
PIF_VALUE: 14
PIF_VALUE: 24
PIF_VALUE: 24
PIF_VALUE: 15
PIF_VALUE: 24
PIF_VALUE: 1
PIF_VALUE: 24
PIF_VALUE: 14
PIF_VALUE: 16
PIF_VALUE: 13
PIF_VALUE: 24
PIF_VALUE: 14
PIF_VALUE: 24
PIF_VALUE: 24
PIF_VALUE: 1
PIF_VALUE: 24
PIF_VALUE: 24
PIF_VALUE: 16
PIF_VALUE: 16
PIF_VALUE: 24
PIF_VALUE: 15
PIF_VALUE: 9
PIF_VALUE: 6
PIF_VALUE: 24
PIF_VALUE: 15
PIF_VALUE: 0
PIF_VALUE: 24
PIF_VALUE: 24
PIF_VALUE: 14
PIF_VALUE: 15
PIF_VALUE: 15
PIF_VALUE: 16
PIF_VALUE: 1
PIF_VALUE: 24
PIF_VALUE: 14
PIF_VALUE: 1
PIF_VALUE: 24
PIF_VALUE: 14
PIF_VALUE: 24
PIF_VALUE: 14
PIF_VALUE: 0
PIF_VALUE: 2
PIF_VALUE: 15
PIF_VALUE: 24
PIF_VALUE: 16
PIF_VALUE: 24
PIF_VALUE: 1

## 2021-08-16 ASSESSMENT — PAIN DESCRIPTION - FREQUENCY
FREQUENCY: CONTINUOUS
FREQUENCY: CONTINUOUS

## 2021-08-16 ASSESSMENT — PAIN DESCRIPTION - PAIN TYPE
TYPE: SURGICAL PAIN

## 2021-08-16 ASSESSMENT — PAIN SCALES - GENERAL
PAINLEVEL_OUTOF10: 4
PAINLEVEL_OUTOF10: 0
PAINLEVEL_OUTOF10: 3
PAINLEVEL_OUTOF10: 4
PAINLEVEL_OUTOF10: 3
PAINLEVEL_OUTOF10: 6
PAINLEVEL_OUTOF10: 3
PAINLEVEL_OUTOF10: 0

## 2021-08-16 ASSESSMENT — PAIN DESCRIPTION - ORIENTATION
ORIENTATION: RIGHT;UPPER
ORIENTATION: RIGHT;UPPER

## 2021-08-16 ASSESSMENT — PAIN DESCRIPTION - LOCATION
LOCATION: ABDOMEN

## 2021-08-16 ASSESSMENT — PAIN DESCRIPTION - PROGRESSION
CLINICAL_PROGRESSION: NOT CHANGED
CLINICAL_PROGRESSION: NOT CHANGED

## 2021-08-16 ASSESSMENT — PAIN DESCRIPTION - DESCRIPTORS
DESCRIPTORS: PRESSURE
DESCRIPTORS: PRESSURE
DESCRIPTORS: CRAMPING;PRESSURE
DESCRIPTORS: DISCOMFORT

## 2021-08-16 ASSESSMENT — PAIN - FUNCTIONAL ASSESSMENT
PAIN_FUNCTIONAL_ASSESSMENT: ACTIVITIES ARE NOT PREVENTED
PAIN_FUNCTIONAL_ASSESSMENT: ACTIVITIES ARE NOT PREVENTED
PAIN_FUNCTIONAL_ASSESSMENT: 0-10
PAIN_FUNCTIONAL_ASSESSMENT: ACTIVITIES ARE NOT PREVENTED

## 2021-08-16 ASSESSMENT — ENCOUNTER SYMPTOMS: SHORTNESS OF BREATH: 0

## 2021-08-16 ASSESSMENT — PAIN DESCRIPTION - ONSET
ONSET: ON-GOING
ONSET: ON-GOING

## 2021-08-16 NOTE — PROGRESS NOTES
4 Eyes Skin Assessment     NAME:  Cris Krause OF BIRTH:  1950  MEDICAL RECORD NUMBER:  [id number]    The patient is being assess for  Admission    I agree that 2 RN's have performed a thorough Head to Toe Skin Assessment on the patient. ALL assessment sites listed below have been assessed. Areas assessed by both nurses:    Head, Face, Ears, Shoulders, Back, Chest, Arms, Elbows, Hands, Sacrum. Buttock, Coccyx, Ischium and Legs. Feet and Heels        Does the Patient have a Wound?  No noted wound(s)       Delmer Prevention initiated:  NA   Wound Care Orders initiated:  NA    Pressure Injury (Stage 3,4, Unstageable, DTI, NWPT, and Complex wounds) if present place consult order under [de-identified] NA    New and Established Ostomies if present place consult order under : NA      Nurse 1 eSignature: Electronically signed by Natali Ward RN on 8/16/21 at 5:23 PM EDT    **SHARE this note so that the co-signing nurse is able to place an eSignature**    Nurse 2 eSignature: Electronically signed by Kalyan Hutchins RN on 8/16/21 at 5:23 PM EDT

## 2021-08-16 NOTE — PROGRESS NOTES
Patient admitted to PACU # 4 from OR at [facility] 29 COLPOPEXY ROBOTIC  per Dr. Aruna Kilpatrick. Attached to PACU monitoring system and report received from anesthesia provider. Patient was reported to be hemodynamically stable during procedure. Patient sleepy from anesthesia with oral airway remaining in place. Continue to monitor. Reported right eyed appears bruised possibly from tape and noted tape allergy. Anesthesia aware.

## 2021-08-16 NOTE — PROGRESS NOTES
Patient admitted to room 4134. Vital signs stable. Patient states she has pressure to abdomen, otherwise no pain at this time.  is at the bedside. Call light in reach and bed alarm on.  Electronically signed by Kevin Landeros RN on 8/16/2021 at 12:01 PM

## 2021-08-16 NOTE — H&P
Date of Surgery Update:  Romel Gamboa was seen, history and physical examination reviewed, and patient examined by me today. There have been no significant clinical changes since the completion of the previous history and physical. The surgical site was confirmed by the patient and me. I have presented reasonable alternatives to the patient's proposed care, treatment, and services. The discussion I have done encompassed risks, benefits, and side effects related to the alternatives and the risks related to not receiving the proposed care, treatment, and services. All questions answered. Patient wishes to proceed.      Electronically signed by: Britatni Cortés MD,8/16/2021,7:29 AM

## 2021-08-16 NOTE — PROGRESS NOTES
PACU Transfer Note    Vitals:    08/16/21 1130   BP: (!) 117/54   Pulse: (!) 46   Resp: 11   Temp: 97.1 °F (36.2 °C)   SpO2: 99%       In: 1500 [I.V.:1500]  Out: -     Pain assessment:  present - adequately treated  Pain Level: 6    Report given to Receiving unit Leonie HORTON.    8/16/2021 11:38 AM

## 2021-08-16 NOTE — OP NOTE
Operative Note      Patient: Fredrick Espana  YOB: 1950  MRN: [id number]    Date of Procedure: 8/16/2021    Pre-Op Diagnosis: INCONTINENCE, PROLAPSE, VAGINAL VAULT PROLAPSE, CYSTOCELE, ENTEROCELE    Post-Op Diagnosis: Same       Procedures: Procedure(s):  VAGINAL POSTERIOR REPAIR  RETROPUBIC SLING  CYSTOSCOPY  SACRAL COLPOPEXY ROBOTIC     Surgeon: Surgeon(s):  Lc Randhawa MD    Anesthesia: General    Assistant: Surgical Assistant: Gio Hutchison; Maura Hanna; Elvia Curling; Angeles Sams    Estimated Blood Loss (mL): * No values recorded between 8/16/2021  7:34 AM and 8/16/2021  9:58 AM *    IV FLUIDS: 1000 milliliter(s) In: 800 [I.V.:800]  Out: -     URINE OUTPUT: 100cc milliters(s)   Output by Drain (mL) 08/14/21 0701 - 08/14/21 1500 08/14/21 1501 - 08/14/21 2300 08/14/21 2301 - 08/15/21 0700 08/15/21 0701 - 08/15/21 1500 08/15/21 1501 - 08/15/21 2300 08/15/21 2301 - 08/16/21 0700 08/16/21 0701 - 08/16/21 0958   Requested LDAs do not have output data documented. Complications: None    Specimens: * No specimens in log *    Implants:   Implant Name Type Inv. Item Serial No.  Lot No. LRB No. Used Action   MESH GYN M [id number] FLAT FLR POLYPR FOR TRANSABDOMINAL  MESH GYN M [id number] FLAT FLR POLYPR FOR TRANSABDOMINAL  COLOPLAST [employer]WD [id number] N/A 1 Implanted   SYSTEM SLN TAISHA SUPRUB MID [address] MID [address] TERAN-WD [id number] N/A 1 Implanted         Drains:   Urethral Catheter Double-lumen;Non-latex 16 fr (Active)       FINDINGS: Stage 3 prolapse. Normal bladder and urethra at cystourethroscopy. No trauma or injury to the bladder. Brisk efflux from the bilateral ureters. INDICATIONS FOR THE PROCEDURE: 69y.o. year old female who presented with significant pelvic organ prolapse following hysterectomy. Surgical history included hysterectomy. On exam, she had Stage Stage 3 prolapse.  The patient desired definitive treatment. The risks and benefits of surgery included but not limited to injury to the bowel, bladder, ureter, internal organs; possibility of nerve entrapment and nerve injury; possibility of bleeding with subsequent transfusion were all discussed with the patient at a great length. She was also counseled on the risks of damage to internal organs, voiding dysfunction, defecatory dysfunction, mesh erosion, dyspareunia, recurrent prolapse, recurrent incontinence, DVT, PE, and death. Patient was counseled regarding prophylactic salpingectomy, reviewed benefits and ACOG recommendations to remove if able and doesn't alter surgical approach. After all questions were answered, she signed the consent and was scheduled for surgery. DESCRIPTION OF THE PROCEDURE: The patient was taken to the operating room where general anesthesia was obtained without difficulty. She was then prepped and draped in normal sterile fashion in dorsal supine lithotomy position using Ricardo stirrups. A Yeh catheter was placed    PNEUMOPERITONEUM: An incison was made just above the umbilicus approximately 8 mm in length. A 5 mm camera was then used with an Optiview and the abdomen was entered directly. The abdomen was then insufflated without difficulty. Two additional robotic ports were placed on the left side under direct visualization approximately three fingerbreadths above the anterosuperior iliac spine and another in the midclavicular line slightly higher than the umbilical port. After this was done, another robotic trocar was then placed on the right side in and port on the other side just slightly more medial and then an accessory port in the midline. The robot was brought in from the side and docked. A Cardiere was placed on the left, a bipolar on the right, and scissors in the middle. SACRAL PROMONTORY:  We were able to identify the sacral promontory and the right ureter.  The peritoneum overlying the sacral promontory was elevated and incised. The incision was carried down in to the pelvis to the posterior side of the vagina along the right lateral sidewall. The ureter was visualized and carefully avoided. SACROCOLPOPEXY: The bladder was carefully dissected off of the anterior vaginal wall by means of cautery and sharp dissection. The rectum was dissected off of the posterior vaginal wall in a similar fashion. After the dissection had been carried as far distally as feasable, two strips of 1 x 6 inch polypropylene mesh were cut. The mesh was inserted into the abdomen and it was secured to the posterior side of the vagina with six 2-0 PDS sutures. The vagina was then placed into the pelvis inferiorly and the bladder was held up with arm #3 and the second piece of mesh that was cut was attached to the anterior vaginal wall with six 2-0 PDS sutures. The sigmoid colon was then held to the patient's left, the appropriate tension was determined and the mesh was cut to fit the sacral promontory without tension. three Ethibond sutures were used to attach the mesh to the anterior longitudinal ligament of the sacrum. This resulted in excellent elevation of the vaginal apex. ABDOMINAL ENTEROCELE REPAIR: The peritoneum was then reperitonealized with a 2-0 Vicryl suture, thereby obliterating the enterocele in a fashion similar to an abdominal Raphael procedure. The abdomen was copiously irrigated and the sigmoid colon was allowed back into the pelvis. The robot was undocked at this point. RETROPUBIC SUBURETHRAL SLING: The bladder was completely drained using the Yeh catheter. Using the previously made incision, the lateral edges were grasped with Allis clamps. Two tunnels were developed bilaterally until the pubic rami were palpated. Areas on the skin were marked 2 cm lateral to the midline above the symphysis pubis.   These areas were injected with 1% lidocaine with epinephrine and incisions were made with a scalpel

## 2021-08-16 NOTE — BRIEF OP NOTE
Brief Postoperative Note      Patient: Cheri Mann  YOB: 1950  MRN: [id number]    Date of Procedure: 8/16/2021    Pre-Op Diagnosis: INCONTINENCE, PROLAPSE, VAGINAL VAULT PROLAPSE, CYSTOCELE, ENTEROCELE    Post-Op Diagnosis: Same       Procedure(s):  VAGINAL POSTERIOR REPAIR  RETROPUBIC SLING  CYSTOSCOPY  SACRAL COLPOPEXY ROBOTIC    Surgeon(s):  Sherrell Eubanks MD    Assistant:  Surgical Assistant: Nemo Boss; Brandon Petty; Georgia Joy; Angeles Sams    Anesthesia: General    Estimated Blood Loss (mL): 420FM    Complications: None    Specimens:   * No specimens in log *    Implants:  Implant Name Type Inv.  Item Serial No.  Lot No. LRB No. Used Action   MESH GYN M [id number] FLAT FLR POLYPR FOR TRANSABDOMINAL  MESH GYN M [id number] FLAT FLR POLYPR FOR TRANSABDOMINAL  COLOPLAST [employer] [id number] N/A 1 Implanted   SYSTEM SLNG TAISHA SUPRPUB MID [address] [facility] [employer] [employer]- [id number] N/A 1 Implanted         Drains:   Urethral Catheter Double-lumen;Non-latex 16 fr (Active)       Findings: large cystocele, enterocele, rectocele    Electronically signed by Kahlil Kidd MD on 8/16/2021 at 9:54 AM

## 2021-08-16 NOTE — ANESTHESIA POSTPROCEDURE EVALUATION
Department of Anesthesiology  Postprocedure Note    Patient: Karen Molina  MRN: [id number]  YOB: 1950  Date of evaluation: 8/16/2021  Time:  1:25 PM     Procedure Summary     Date: 08/16/21 Room / Location: [address] [place] [address]    Anesthesia Start: 2790 Anesthesia Stop: 1005    Procedures:       VAGINAL POSTERIOR REPAIR (N/A Vagina )      RETROPUBIC SLING (N/A Vagina )      CYSTOSCOPY (N/A Bladder)      SACRAL COLPOPEXY ROBOTIC (N/A Pelvis) Diagnosis:       Functional urinary incontinence      Female genital prolapse, unspecified type      Prolapse of vaginal vault after hysterectomy      Vaginal enterocele, congenital or acquired      Cystocele with prolapse      (INCONTINENCE, PROLAPSE, VAGINAL VAULT PROLAPSE, CYSTOCELE, ENTEROCELE)    Surgeons: Dawson Cheema MD Responsible Provider: Alexandro Gonzalez MD    Anesthesia Type: general ASA Status: 3          Anesthesia Type: general    Belle Phase I: Belle Score: 8    Belle Phase II:      Last vitals: Reviewed and per EMR flowsheets.        Anesthesia Post Evaluation    Patient location during evaluation: PACU  Level of consciousness: awake and alert  Airway patency: patent  Nausea & Vomiting: no nausea and no vomiting  Complications: no  Cardiovascular status: blood pressure returned to baseline  Respiratory status: acceptable  Hydration status: euvolemic  Comments: Postoperative Anesthesia Note    Name:    Karen Molina  MRN:      [id number]    Patient Vitals in the past 12 hrs:  08/16/21 1239, SpO2:98 %  08/16/21 1154, BP:106/62, Temp:97.6 °F (36.4 °C), Temp src:Oral, Pulse:(!) 46, Resp:13, SpO2:98 %  08/16/21 1135, Pulse:(!) 48, Resp:8, SpO2:100 %  08/16/21 1130, BP:(!) 117/54, Temp:97.1 °F (36.2 °C), Temp src:Temporal, Pulse:(!) 46, Resp:11, SpO2:99 %  08/16/21 1115, BP:(!) 108/48, Pulse:(!) 47, Resp:14, SpO2:95 %  08/16/21 1100, BP:(!) 107/46, Pulse:(!) 43, Resp:10, SpO2:99 %  08/16/21 1045, BP:(!) 124/54, Pulse:(!) 46, Resp:9, SpO2:100 %  08/16/21 1035, BP:(!) 123/50, Pulse:(!) 48, Resp:9, SpO2:100 %  08/16/21 1030, BP:(!) 114/44, Pulse:(!) 45, Resp:9, SpO2:100 %  08/16/21 1025, BP:(!) 119/53, Pulse:50, Resp:9, SpO2:100 %  08/16/21 1020, BP:(!) 119/53, Pulse:(!) 45, Resp:13, SpO2:100 %  08/16/21 1015, BP:(!) 130/52, Pulse:(!) 48, Resp:10, SpO2:99 %  08/16/21 1010, BP:(!) 137/56, Pulse:51, SpO2:98 %  08/16/21 1008, BP:(!) 133/46, Temp:97.4 °F (36.3 °C), Temp src:Temporal, Pulse:58, Resp:8, SpO2:94 %  08/16/21 0619, BP:(!) 175/75, Temp:97.6 °F (36.4 °C), Temp src:Temporal, Pulse:57, Resp:15, SpO2:98 %, Height:5' 1\" (1.549 m), Weight:120 lb 13 oz (54.8 kg)     LABS:    CBC  No results found for: WBC, HGB, HCT, PLT  RENAL  No results found for: NA, K, CL, CO2, BUN, CREATININE, GLUCOSE  COAGS  No results found for: PROTIME, INR, APTT    Intake & Output:  @10OLFN@    Nausea & Vomiting:  No    Level of Consciousness:  Awake    Pain Assessment:  Adequate analgesia    Anesthesia Complications:  No apparent anesthetic complications    SUMMARY      Vital signs stable  OK to discharge from Stage I post anesthesia care.   Care transferred from Anesthesiology department on discharge from perioperative area

## 2021-08-16 NOTE — ANESTHESIA PRE PROCEDURE
[facility] Department of Anesthesiology  Pre-Anesthesia Evaluation/Consultation       Name:  Guillermina Zhou  : 1950  Age:  69 y.o.                                            MRN:  [id number]  Date: 2021           Surgeon: Surgeon(s):  Xochitl Ramsey MD    Procedure: Procedure(s):  VAGINAL POSTERIOR REPAIR  RETROPUBIC SLING  CYSTOSCOPY  SACRAL COLPOPEXY ROBOTIC     Allergies   Allergen Reactions    Latex Itching and Rash    Adhesive Tape Itching and Rash     Medical tape      Amoxicillin Nausea Only    Corn Oil Other (See Comments)     Pt unable to recall reaction    Cornsilk Other (See Comments)     Pt unable to recall reaction    Metronidazole Rash    Neomycin Rash    Polymyxin B Rash    Soy Allergy Other (See Comments)     Pt unable to recall reaction     Patient Active Problem List   Diagnosis    Dermatitis    Chronic allergic rhinitis    Diverticulosis    Essential hypertension    Hypothyroidism due to acquired atrophy of thyroid    Left shoulder pain    Other and unspecified derangement of medial meniscus    Other atopic dermatitis    Patellofemoral disorder of right knee    Polyp of colon, adenomatous    Postmenopausal atrophic vaginitis    Right knee pain    SCC (squamous cell carcinoma), face    Wrist pain, right    Abnormal x-ray examination    Shoulder pain    Adenomatous polyp of colon     Past Medical History:   Diagnosis Date    Anemia     Arthritis     Atopic dermatitis     Cystocele, midline     Dermatitis     Diverticulosis     Endometriosis     Essential hypertension     not medically treated    Female bladder prolapse     Glaucoma     Hypothyroidism due to acquired atrophy of thyroid     Patellofemoral disorder of right knee     Polyp of colon, adenomatous     Postmenopausal atrophic vaginitis     Rectocele     SCC (squamous cell carcinoma), face     Skin cancer     Wears glasses      Past Surgical History:   Procedure Laterality Date  BUNIONECTOMY Left     EYE SURGERY Bilateral     SLT Laswer eye treatment    HYSTERECTOMY      OSTEOTOMY      FERNANDO AND BSO  1981    FERNANDO, RMVL tube, RMVL ovary with right oophorectomy    TONSILLECTOMY       Social History     Tobacco Use    Smoking status: Former Smoker     Packs/day: 1.50     Years: 10.00     Pack years: 15.00     Types: Cigarettes     Quit date: 1980     Years since quittin.6    Smokeless tobacco: Never Used   Vaping Use    Vaping Use: Never used   Substance Use Topics    Alcohol use: Yes     Comment: occaionally    Drug use: Never     Medications  No current facility-administered medications on file prior to encounter. Current Outpatient Medications on File Prior to Encounter   Medication Sig Dispense Refill    pimecrolimus (ELIDEL) 1 % cream Apply topically 2 times daily Apply topically 2 times daily.  Omega-3 Fatty Acids (FISH OIL) 1000 MG CAPS Take 1,000 mg by mouth daily       Vitamin D, Cholecalciferol, 25 MCG (1000 UT) CAPS Take 1 tablet by mouth      SOOLANTRA 1 % CREA daily as needed       nystatin-triamcinolone (MYCOLOG II) 583361-1.1 UNIT/GM-% cream Apply topically daily as needed       ketoconazole (NIZORAL) 2 % cream daily as needed       Ergocalciferol 10 MCG (400 UNIT) TABS Take 1 tablet by mouth daily       conjugated estrogens (PREMARIN) 0.625 MG/GM vaginal cream Apply pea size manually to the vagina. 2 times per week.  42.5 g 3    SYNTHROID 112 MCG tablet Take 112 mcg by mouth Daily       estradiol (VIVELLE) 0.05 MG/24HR Place 1 patch onto the skin Twice a Week       Estradiol (VAGIFEM) 10 MCG TABS vaginal tablet Place 10 mcg vaginally See Admin Instructions Two times per week      LUMIGAN 0.01 % SOLN ophthalmic drops INSTILL 1 DROP INTO BOTH EYES AT BEDTIME       Current Facility-Administered Medications   Medication Dose Route Frequency Provider Last Rate Last Admin    0.9 % sodium chloride infusion   Intravenous Continuous Melburn Alu,  mL/hr at 21 New Bag at 21    sodium chloride flush 0.9 % injection 10 mL  10 mL Intravenous 2 times per day Larissa Melgoza MD        sodium chloride flush 0.9 % injection 10 mL  10 mL Intravenous PRN Larissa Melgoza MD        0.9 % sodium chloride infusion  25 mL Intravenous PRN Larissa Melgoza MD        ceFAZolin (ANCEF) 2000 mg in dextrose 5 % 100 mL IVPB  2,000 mg Intravenous On Call to Doris Montanez MD         Vital Signs (Current)   Vitals:    08/10/21 1713 21   BP:  (!) 175/75   Pulse:  57   Resp:  15   Temp:  97.6 °F (36.4 °C)   TempSrc:  Temporal   SpO2:  98%   Weight: 120 lb (54.4 kg) 120 lb 13 oz (54.8 kg)   Height: 5' 1\" (1.549 m) 5' 1\" (1.549 m)                                            Vital Signs Statistics (for past 48 hrs)     Temp  Av.6 °F (36.4 °C)  Min: 97.6 °F (36.4 °C)   Min taken time: 21  Max: 97.6 °F (36.4 °C)   Max taken time: 21  Pulse  Av  Min: 62   Min taken time: 21  Max: 62   Max taken time: 21  Resp  Avg: 15  Min: 13   Min taken time: 21  Max: 15   Max taken time: 21  BP  Min: 175/75   Min taken time: 21  Max: 175/75   Max taken time: 21  SpO2  Av %  Min: 98 %   Min taken time: 21  Max: 98 %   Max taken time: 21  BP Readings from Last 3 Encounters:   21 (!) 175/75   21 (!) 166/80   21 (!) 169/74       BMI  Body mass index is 22.83 kg/m². Estimated body mass index is 22.83 kg/m² as calculated from the following:    Height as of this encounter: 5' 1\" (1.549 m). Weight as of this encounter: 120 lb 13 oz (54.8 kg).     CBC No results found for: WBC, RBC, HGB, HCT, MCV, RDW, PLT  CMP  No results found for: NA, K, CL, CO2, BUN, CREATININE, GFRAA, AGRATIO, LABGLOM, GLUCOSE, PROT, CALCIUM, BILITOT, ALKPHOS, AST, ALT  BMP  No results found for: NA, K, CL, CO2, BUN, CREATININE, CALCIUM, GFRAA, LABGLOM, GLUCOSE  POCGlucose  No results for input(s): GLUCOSE in the last 72 hours. Coags  No results found for: PROTIME, INR, APTT  HCG (If Applicable) No results found for: PREGTESTUR, PREGSERUM, HCG, HCGQUANT   ABGs No results found for: PHART, PO2ART, HAQ6ELE, HCN7MCQ, BEART, I6BTTBOB   Type & Screen (If Applicable)  No results found for: LABABO, LABRH                         BMI: Wt Readings from Last 3 Encounters:       NPO Status:   Date of last liquid consumption: 08/15/21   Time of last liquid consumption: 2030   Date of last solid food consumption: 08/15/21      Time of last solid consumption: 2030       Anesthesia Evaluation  Patient summary reviewed and Nursing notes reviewed  Airway: Mallampati: II  TM distance: >3 FB   Neck ROM: full  Mouth opening: > = 3 FB Dental: normal exam         Pulmonary:       (-) COPD, asthma and shortness of breath                           Cardiovascular:    (+) hypertension:,     (-) valvular problems/murmurs, past MI, CAD, CABG/stent, dysrhythmias,  angina and no hyperlipidemia                Neuro/Psych:      (-) seizures, TIA and CVA           GI/Hepatic/Renal:        (-) GERD, PUD, liver disease and no renal disease       Endo/Other:    (+) : arthritis:., .    (-) diabetes mellitus               Abdominal:             Vascular: negative vascular ROS. Other Findings:             Anesthesia Plan      general     ASA 3     (I discussed with the patient the risks and benefits of PIV, general anesthesia, IV Narcotics, PACU. All questions were answered the patient agrees with the plan.)  Induction: intravenous. Anesthetic plan and risks discussed with patient. Plan discussed with CRNA. This pre-anesthesia assessment may be used as a history and physical.    DOS STAFF ADDENDUM:    Pt seen and examined, chart reviewed (including anesthesia, drug and allergy history). No interval changes to history and physical examination.   Anesthetic plan,

## 2021-08-17 VITALS
HEART RATE: 42 BPM | DIASTOLIC BLOOD PRESSURE: 59 MMHG | BODY MASS INDEX: 22.81 KG/M2 | HEIGHT: 61 IN | TEMPERATURE: 97.9 F | RESPIRATION RATE: 16 BRPM | WEIGHT: 120.81 LBS | SYSTOLIC BLOOD PRESSURE: 103 MMHG | OXYGEN SATURATION: 99 %

## 2021-08-17 PROBLEM — N81.9 VAGINAL VAULT PROLAPSE: Status: RESOLVED | Noted: 2021-08-16 | Resolved: 2021-08-17

## 2021-08-17 PROCEDURE — 2580000003 HC RX 258: Performed by: OBSTETRICS & GYNECOLOGY

## 2021-08-17 PROCEDURE — 94761 N-INVAS EAR/PLS OXIMETRY MLT: CPT

## 2021-08-17 PROCEDURE — 6360000002 HC RX W HCPCS: Performed by: OBSTETRICS & GYNECOLOGY

## 2021-08-17 PROCEDURE — 6370000000 HC RX 637 (ALT 250 FOR IP): Performed by: OBSTETRICS & GYNECOLOGY

## 2021-08-17 RX ORDER — POLYETHYLENE GLYCOL 3350 17 G/17G
17 POWDER, FOR SOLUTION ORAL DAILY
Qty: 1530 G | Refills: 1 | Status: SHIPPED | OUTPATIENT
Start: 2021-08-17 | End: 2021-09-16

## 2021-08-17 RX ORDER — PSYLLIUM SEED (WITH DEXTROSE)
1 POWDER (GRAM) ORAL 2 TIMES DAILY
Qty: 538 G | Refills: 1 | Status: SHIPPED | OUTPATIENT
Start: 2021-08-17 | End: 2021-09-28

## 2021-08-17 RX ORDER — OXYCODONE HYDROCHLORIDE AND ACETAMINOPHEN 5; 325 MG/1; MG/1
1 TABLET ORAL EVERY 6 HOURS PRN
Qty: 20 TABLET | Refills: 0 | Status: SHIPPED | OUTPATIENT
Start: 2021-08-17 | End: 2021-08-22

## 2021-08-17 RX ORDER — DOCUSATE SODIUM 100 MG/1
100 CAPSULE, LIQUID FILLED ORAL 2 TIMES DAILY
Qty: 120 CAPSULE | Refills: 1 | Status: SHIPPED | OUTPATIENT
Start: 2021-08-17 | End: 2021-10-16

## 2021-08-17 RX ORDER — SIMETHICONE 80 MG
80 TABLET,CHEWABLE ORAL 4 TIMES DAILY PRN
Qty: 180 TABLET | Refills: 3 | Status: SHIPPED | OUTPATIENT
Start: 2021-08-17

## 2021-08-17 RX ADMIN — KETOROLAC TROMETHAMINE 15 MG: 15 INJECTION, SOLUTION INTRAMUSCULAR; INTRAVENOUS at 06:07

## 2021-08-17 RX ADMIN — KETOROLAC TROMETHAMINE 15 MG: 15 INJECTION, SOLUTION INTRAMUSCULAR; INTRAVENOUS at 02:14

## 2021-08-17 RX ADMIN — LEVOTHYROXINE SODIUM 112 MCG: 0.11 TABLET ORAL at 06:07

## 2021-08-17 RX ADMIN — SODIUM CHLORIDE: 9 INJECTION, SOLUTION INTRAVENOUS at 06:04

## 2021-08-17 ASSESSMENT — PAIN SCALES - GENERAL
PAINLEVEL_OUTOF10: 2
PAINLEVEL_OUTOF10: 0

## 2021-08-17 NOTE — PROGRESS NOTES
Bladder filled with 300ml via banks catheter. Patient successfully able to void 500ml after catheter was removed.  Electronically signed by Delmis Pineda RN on 8/17/2021 at 8:23 AM

## 2021-08-17 NOTE — DISCHARGE SUMMARY
Hospital Discharge Summary      Patient ID: Jennifer Lee      Patient's PCP: JUAN DIEGO Moura Date: 2021     Discharge Date: 2021  The patient was seen and examined on day of discharge and this discharge summary is in conjunction with any daily progress note from day of discharge. Admitting Physician: Dhara Tovar MD    Discharge Physician: CAROL Teixeira CNP     Admitted for No chief complaint on file. Admitting Diagnosis Functional urinary incontinence [R39.81]  Female genital prolapse, unspecified type [N81.9]  Prolapse of vaginal vault after hysterectomy [N99.3]  Vaginal enterocele, congenital or acquired [N81.5]  Cystocele with prolapse [N81.4]  Vaginal vault prolapse [N81.9]    Discharge Diagnoses: There are no active hospital problems to display for this patient. Hospital Course:   POD 1. Patient out of bed. Pain is well managed. Vaginal packing removed. She has passed her voiding trial.  Tolerating liquids and advanced to regular diet      Consults:     None        Disposition: home    Discharged Condition: Stable    Code Status: Full Code    Activity: no lifting, Driving, or Strenuous exercise for 2 weeks    Diet: regular diet      Wound Care: none needed    SUBJECTIVE / Interval History:   Patient is POD 1 from prolapse surgery. She reports minimal pain or bleeding. She has passed her voiding trial and vaginal packing removed. All post op instructions reviewed and she denies questions    Exam:  TEMPERATURE:  Current - Temp: 97.9 °F (36.6 °C);  Max - Temp  Av.4 °F (35.2 °C)  Min: 95 °F (35 °C)  Max: 97.9 °F (36.6 °C)  RESPIRATIONS RANGE: Resp  Av  Min: 1  Max: 16  PULSE RANGE: Pulse  Av.5  Min: 42  Max: 58  BLOOD PRESSURE RANGE:  Systolic (39JJO), UGA:358 , Min:99 , JQA:108   ; Diastolic (60DNK), TSY:00, Min:44, Max:73    PULSE OXIMETRY RANGE: SpO2  Av.6 %  Min: 94 %  Max: 100 %  24HR INTAKE/OUTPUT:    Intake/Output Summary (Last 24 hours) at 8/17/2021 0840  Last data filed at 8/17/2021 0755  Gross per 24 hour   Intake 3929.96 ml   Output 1700 ml   Net 2229.96 ml      Wt Readings from Last 1 Encounters:   08/16/21 120 lb 13 oz (54.8 kg)     BMI Readings from Last 1 Encounters:   08/16/21 22.83 kg/m²         General appearance: No apparent distress, appears stated age and cooperative. HEENT Normal cephalic, atraumatic without obvious deformity. Pupils equal, round, and reactive to light. Extra ocular muscles intact. Conjunctivae/corneas clear. Neck: Supple, No jugular venous distention/bruits. Trachea midline without thyromegaly or adenopathy with full range of motion. Lungs: Clear to ascultation, bilaterally without Rales/Wheezes/Rhonchi with good respiratory effort. Heart: Regular rate and rhythm with Normal S1/S2 without  murmurs, rubs or gallops, point of maximum impulse non-displaced  Abdomen: Soft, non-tender or non-distended without rigidity or guarding and positive bowel sounds all four quadrants. Extremities: No clubbing, cyanosis, or edema bilaterally. Full range of motion without deformity and normal gait intact. Skin: Skin color, texture, turgor normal.  No rashes or lesions. Neurologic: Alert and oriented X 3,  neurovascularly intact with sensory/motor intact upper extremities/lower extremities, bilaterally. Cranial nerves:II-XII intact, grossly non-focal.  Mental status: Alert, oriented, thought content appropriate      Labs: For convenience and continuity at follow-up the following most recent labs are provided:    CBC: No results found for: WBC, HGB, HCT, PLT    RENAL: No results found for: NA, K, CL, CO2, BUN, CREATININE        Discharge Medications:    Ashley Ojeda   Home Medication Instructions OEE:[id number]    Printed on:08/17/21 0840   Medication Information                      conjugated estrogens (PREMARIN) 0.625 MG/GM vaginal cream  Apply pea size manually to the vagina.  2 times per however, inadvertent computerized transcription errors may be present.

## 2021-08-17 NOTE — PLAN OF CARE
Problem: Falls - Risk of:  Goal: Will remain free from falls  Description: Will remain free from falls  8/17/2021 1042 by Elise Alexander RN  Outcome: Ongoing  8/17/2021 0133 by Shaw Phoenix RN  Outcome: Ongoing  Goal: Absence of physical injury  Description: Absence of physical injury  8/17/2021 1042 by Elise Alexander RN  Outcome: Ongoing  8/17/2021 0133 by Shaw Phoenix RN  Outcome: Ongoing     Problem: Pain:  Goal: Pain level will decrease  Description: Pain level will decrease  8/17/2021 1042 by Elise Alexander RN  Outcome: Ongoing  8/17/2021 0133 by Shaw Phoenix RN  Outcome: Ongoing  Goal: Control of acute pain  Description: Control of acute pain  8/17/2021 1042 by Elise Alexander RN  Outcome: Ongoing  8/17/2021 0133 by Shaw Phoenix RN  Outcome: Ongoing  Goal: Control of chronic pain  Description: Control of chronic pain  8/17/2021 1042 by Elise Alexander RN  Outcome: Ongoing  8/17/2021 0133 by Shaw Phoenix RN  Outcome: Ongoing     Problem: Discharge Planning:  Goal: Discharged to appropriate level of care  Description: Discharged to appropriate level of care  8/17/2021 1042 by Elise Alexander RN  Outcome: Ongoing  8/17/2021 0133 by Shaw Phoenix RN  Outcome: Ongoing

## 2021-08-17 NOTE — CARE COORDINATION
INITIAL CASE MANAGEMENT ASSESSMENT    Reviewed chart, met with patient to assess possible discharge needs. Explained Case Management role/services. Living Situation: Patient lives with her  in a condo with 3 steps to enter. ADLs: Independent     DME: None    PT/OT Recs: None     Active Services: None     Transportation: Active / will transport     Medications: [employer]/no barriers    PCP: Dnaielle Langley      HD/PD: N/A    PLAN/COMMENTS: Patient will return to home with her . SW/CM provided contact information for patient or family to call with any questions. SW/CM will follow and assist as needed.  Electronically signed by Venkat Valle RN on 8/17/2021 at 11:13 AM

## 2021-08-17 NOTE — ACP (ADVANCE CARE PLANNING)
Advance Care Planning     Advance Care Planning Activator (Inpatient)  Conversation Note      Date of ACP Conversation: 8/17/2021     Conversation Conducted with: Patient with Decision Making Capacity    ACP Activator: Pratik Blankenship RN    Health Care Decision Maker:     Current Designated Health Care Decision Maker:     Primary Decision Maker: Antony Clements - Spouse - [phone]    Care Preferences    Ventilation: \"If you were in your present state of health and suddenly became very ill and were unable to breathe on your own, what would your preference be about the use of a ventilator (breathing machine) if it were available to you? \"      Would the patient desire the use of ventilator (breathing machine)?: yes    \"If your health worsens and it becomes clear that your chance of recovery is unlikely, what would your preference be about the use of a ventilator (breathing machine) if it were available to you? \"     Would the patient desire the use of ventilator (breathing machine)?: No      Resuscitation  \"CPR works best to restart the heart when there is a sudden event, like a heart attack, in someone who is otherwise healthy. Unfortunately, CPR does not typically restart the heart for people who have serious health conditions or who are very sick. \"    \"In the event your heart stopped as a result of an underlying serious health condition, would you want attempts to be made to restart your heart (answer \"yes\" for attempt to resuscitate) or would you prefer a natural death (answer \"no\" for do not attempt to resuscitate)? \" yes       [] Yes   [x] No   Educated Patient / Tin Public regarding differences between Advance Directives and portable DNR orders.     Length of ACP Conversation in minutes:   5    Conversation Outcomes:  [x] ACP discussion completed  [] Existing advance directive reviewed with patient; no changes to patient's previously recorded wishes  [] New Advance Directive completed  [] Portable Do Not Rescitate prepared for Provider review and signature  [] POLST/POST/MOLST/MOST prepared for Provider review and signature      Follow-up plan:    [] Schedule follow-up conversation to continue planning  [] Referred individual to Provider for additional questions/concerns   [] Advised patient/agent/surrogate to review completed ACP document and update if needed with changes in condition, patient preferences or care setting    [x] This note routed to one or more involved healthcare providers    Electronically signed by Linda Saucedo RN on 8/17/2021 at 11:15 AM

## 2021-08-17 NOTE — PLAN OF CARE
Problem: Falls - Risk of:  Goal: Will remain free from falls  Description: Will remain free from falls  8/17/2021 1257 by Nils Soto RN  Outcome: Completed  8/17/2021 1042 by Nils Soto RN  Outcome: Ongoing  8/17/2021 0133 by Jose Claire RN  Outcome: Ongoing  Goal: Absence of physical injury  Description: Absence of physical injury  8/17/2021 1257 by Nils Soto RN  Outcome: Completed  8/17/2021 1042 by Nils Soto RN  Outcome: Ongoing  8/17/2021 0133 by Jose Claire RN  Outcome: Ongoing     Problem: Pain:  Goal: Pain level will decrease  Description: Pain level will decrease  8/17/2021 1257 by Nils Soto RN  Outcome: Completed  8/17/2021 1042 by Nils Soto RN  Outcome: Ongoing  8/17/2021 0133 by Jose Claire RN  Outcome: Ongoing  Goal: Control of acute pain  Description: Control of acute pain  8/17/2021 1257 by Nils Soto RN  Outcome: Completed  8/17/2021 1042 by Nils Soto RN  Outcome: Ongoing  8/17/2021 0133 by Jose Claire RN  Outcome: Ongoing  Goal: Control of chronic pain  Description: Control of chronic pain  8/17/2021 1257 by Nils Soto RN  Outcome: Completed  8/17/2021 1042 by Nils Soto RN  Outcome: Ongoing  8/17/2021 0133 by Jose Claire RN  Outcome: Ongoing     Problem: Discharge Planning:  Goal: Discharged to appropriate level of care  Description: Discharged to appropriate level of care  8/17/2021 1257 by Nils Soto RN  Outcome: Completed  8/17/2021 1042 by Nils Soto RN  Outcome: Ongoing  8/17/2021 0133 by Jose Claire RN  Outcome: Ongoing

## 2021-08-19 ENCOUNTER — TELEPHONE (OUTPATIENT)
Dept: UROGYNECOLOGY | Age: 71
End: 2021-08-19

## 2021-08-19 NOTE — TELEPHONE ENCOUNTER
Post Surgery Follow up    Pain- Taking Percocet and Ibuprofen as prescribed by Dr. Robyn Dorsey. Patient denies any complaints at this time. Bowel Movements-  Taking Miralax, colace, and metamucil as prescribed by Dr Robyn Dorsey. No BM since surgery . Patient denies any complaints at this time. Urinary-  Denies any complaints at this time. Vaginal area- Vaginal bleeding is decreasing everyday. Denies any complaints at this time. Swelling in ankles and feet. Pt denies pain in legs. \"feels like heartburn, pain and tightness under her bra line. \"  Per Dr Robyn Dorsey, go to ER for evaluation. Pt verbalized understanding.

## 2021-08-20 ENCOUNTER — TELEPHONE (OUTPATIENT)
Dept: UROGYNECOLOGY | Age: 71
End: 2021-08-20

## 2021-08-30 ENCOUNTER — OFFICE VISIT (OUTPATIENT)
Dept: UROGYNECOLOGY | Age: 71
End: 2021-08-30

## 2021-08-30 VITALS
RESPIRATION RATE: 14 BRPM | SYSTOLIC BLOOD PRESSURE: 140 MMHG | TEMPERATURE: 98.9 F | HEART RATE: 51 BPM | DIASTOLIC BLOOD PRESSURE: 51 MMHG | OXYGEN SATURATION: 100 %

## 2021-08-30 DIAGNOSIS — Z09 POSTOP CHECK: Primary | ICD-10-CM

## 2021-08-30 PROCEDURE — 99024 POSTOP FOLLOW-UP VISIT: CPT | Performed by: NURSE PRACTITIONER

## 2021-08-30 ASSESSMENT — ENCOUNTER SYMPTOMS
BACK PAIN: 1
ABDOMINAL PAIN: 1

## 2021-08-30 NOTE — PROGRESS NOTES
8/30/2021       HPI:     Name: Manuel Hobson  YOB: 1950    CC: Manuel Hobson is a 69 y.o. female who is here for a post op evaluation.   HPI: Recently underwent  VAGINAL POSTERIOR REPAIR  RETROPUBIC SLING  CYSTOSCOPY  SACRAL COLPOPEXY ROBOTIC    Voiding difficulty: No  Initiating stream: No  Force stream: No  Lean forward to empty: No  Slow/intermittent stream: No  Unable to empty bladder: No  Incontinence: no   Urgency without incontinence: No   Frequency without incontinence: No   Bowel functions: normal   Pain Controlled: No    Past Medical History:   Past Medical History:   Diagnosis Date    Anemia     Arthritis     Atopic dermatitis     Cystocele, midline     Dermatitis     Diverticulosis     Endometriosis     Essential hypertension     not medically treated    Female bladder prolapse     Glaucoma     Hypothyroidism due to acquired atrophy of thyroid     Patellofemoral disorder of right knee     Polyp of colon, adenomatous     Postmenopausal atrophic vaginitis     Rectocele     SCC (squamous cell carcinoma), face     Skin cancer     Wears glasses      Past Surgical History:   Past Surgical History:   Procedure Laterality Date    BUNIONECTOMY Left     COLPOPEXY N/A 8/16/2021    SACRAL COLPOPEXY ROBOTIC performed by Shweta Paulson MD at [facility] 8/16/2021    CYSTOSCOPY performed by Shweta Paulson MD at [address] Bilateral     SLT Laswer eye treatment    HYSTERECTOMY      OSTEOTOMY      FERNANDO AND BSO  1981    FERNANDO, RMVL tube, RMVL ovary with right oophorectomy    TONSILLECTOMY      URETHRAL SURGERY N/A 8/16/2021    RETROPUBIC SLING performed by Shweta Paulson MD at [facility] N/A 8/16/2021    VAGINAL POSTERIOR REPAIR performed by Shweta Paulson MD at [facility] OR     Current Medications:  Current Outpatient Medications   Medication Sig Dispense Refill    simethicone (MYLICON) 80 MG chewable tablet Take 1 tablet by mouth 4 times daily as needed for Flatulence 180 tablet 3    docusate sodium (COLACE) 100 MG capsule Take 1 capsule by mouth 2 times daily 120 capsule 1    Psyllium (METAMUCIL) 48.57 % POWD Take 1 Scoop by mouth 2 times daily 538 g 1    polyethylene glycol (GLYCOLAX) 17 GM/SCOOP powder Take 17 g by mouth daily 1530 g 1    pimecrolimus (ELIDEL) 1 % cream Apply topically 2 times daily Apply topically 2 times daily.  Omega-3 Fatty Acids (FISH OIL) 1000 MG CAPS Take 1,000 mg by mouth daily       Vitamin D, Cholecalciferol, 25 MCG (1000 UT) CAPS Take 1 tablet by mouth      SOOLANTRA 1 % CREA daily as needed       nystatin-triamcinolone (MYCOLOG II) 324772-3.1 UNIT/GM-% cream Apply topically daily as needed       ketoconazole (NIZORAL) 2 % cream daily as needed       Ergocalciferol 10 MCG (400 UNIT) TABS Take 1 tablet by mouth daily       conjugated estrogens (PREMARIN) 0.625 MG/GM vaginal cream Apply pea size manually to the vagina. 2 times per week. 42.5 g 3    SYNTHROID 112 MCG tablet Take 112 mcg by mouth Daily       estradiol (VIVELLE) 0.05 MG/24HR Place 1 patch onto the skin Twice a Week       LUMIGAN 0.01 % SOLN ophthalmic drops INSTILL 1 DROP INTO BOTH EYES AT BEDTIME      Estradiol (VAGIFEM) 10 MCG TABS vaginal tablet Place 10 mcg vaginally See Admin Instructions Two times per week -Tuesday  and Friday (Patient not taking: Reported on 8/30/2021)       No current facility-administered medications for this visit. Allergies:    Allergies   Allergen Reactions    Latex Itching and Rash    Adhesive Tape Itching and Rash     Medical tape      Amoxicillin Nausea Only    Corn Oil Other (See Comments)     Pt unable to recall reaction    Cornsilk Other (See Comments)     Pt unable to recall reaction    Metronidazole Rash    Neomycin Rash    Polymyxin B Rash    Soy Allergy Other (See Comments)     Pt unable to recall reaction     Social History:   Social History     Socioeconomic History    Marital status:      Spouse name: Not on file    Number of children: Not on file    Years of education: Not on file    Highest education level: Not on file   Occupational History    Occupation: retired    Tobacco Use    Smoking status: Former Smoker     Packs/day: 1.50     Years: 10.00     Pack years: 15.00     Types: Cigarettes     Quit date: 1980     Years since quittin.6    Smokeless tobacco: Never Used   Vaping Use    Vaping Use: Never used   Substance and Sexual Activity    Alcohol use: Yes     Comment: occaionally    Drug use: Never    Sexual activity: Not Currently     Partners: Male     Comment: due to prolapse and pain   Other Topics Concern    Not on file   Social History Narrative    Not on file     Social Determinants of Health     Financial Resource Strain:     Difficulty of Paying Living Expenses:    Food Insecurity:     Worried About [address] in the Last Year:     [address] [employer] in the Last Year:    Transportation Needs:     Lack of Transportation (Medical):  Lack of Transportation (Non-Medical):    Physical Activity:     Days of Exercise per Week:     Minutes of Exercise per Session:    Stress:     Feeling of Stress :    Social Connections:     Frequency of Communication with Friends and Family:     Frequency of Social Gatherings with Friends and Family:     Attends Latter-day Services:     Active Member of Clubs or Organizations:     Attends Club or Organization Meetings:     Marital Status:    Intimate Partner Violence:     Fear of Current or Ex-Partner:     Emotionally Abused:     Physically Abused:     Sexually Abused:      Family History:   Family History   Problem Relation Age of Onset    Colon Cancer Other     Stroke Other     High Blood Pressure Mother     Mental Illness Mother     High Blood Pressure Father      Review of System   Review of Systems   Constitutional: Positive for activity change, appetite change and fatigue.    HENT: Positive for tinnitus. Gastrointestinal: Positive for abdominal pain. Genitourinary: Positive for vaginal bleeding. Musculoskeletal: Positive for back pain. Allergic/Immunologic: Positive for environmental allergies and food allergies. A review of systems was done by the patient and reviewed by me and scanned into media today. Objective:     Vitals  Vitals:    08/30/21 1133   BP: (!) 140/51   Pulse: 51   Resp: 14   Temp: 98.9 °F (37.2 °C)   SpO2: 100%     Physical Exam  Physical Exam  Vitals and nursing note reviewed. Constitutional:       Appearance: Normal appearance. HENT:      Head: Normocephalic. Eyes:      Conjunctiva/sclera: Conjunctivae normal.   Cardiovascular:      Rate and Rhythm: Normal rate. Pulmonary:      Effort: Pulmonary effort is normal.   Musculoskeletal:         General: Normal range of motion. Cervical back: Normal range of motion. Skin:     General: Skin is warm and dry. Neurological:      General: No focal deficit present. Mental Status: She is alert and oriented to person, place, and time. Psychiatric:         Mood and Affect: Mood normal.         Behavior: Behavior normal.         Thought Content: Thought content normal.       All surgical incisions healing well. No erythema. No evidence of hematoma or abscess. No results found for this visit on 08/30/21. Assessnent/Plan:     Alexis Vidales is a 69 y.o. female with   1. Postop check        Patient is doing well 2 weeks. Patient is to follow up in 4 weeks . Restrictions reviewed  Salazar Khan, APRN - CNP      No orders of the defined types were placed in this encounter. No orders of the defined types were placed in this encounter.       Salazar Khan, APRN - CNP

## 2021-09-10 ENCOUNTER — TELEPHONE (OUTPATIENT)
Dept: UROGYNECOLOGY | Age: 71
End: 2021-09-10

## 2021-09-10 PROBLEM — Z80.0 FAMILY HISTORY OF COLON CANCER: Status: ACTIVE | Noted: 2021-09-10

## 2021-09-10 NOTE — TELEPHONE ENCOUNTER
Pt called. Burning went away with urination while using water during urination. Pt to continue this technique until stiches dissolve. Pt verbalized understanding.

## 2021-09-28 ENCOUNTER — OFFICE VISIT (OUTPATIENT)
Dept: UROGYNECOLOGY | Age: 71
End: 2021-09-28

## 2021-09-28 VITALS
DIASTOLIC BLOOD PRESSURE: 61 MMHG | TEMPERATURE: 96.8 F | RESPIRATION RATE: 18 BRPM | OXYGEN SATURATION: 99 % | HEART RATE: 59 BPM | SYSTOLIC BLOOD PRESSURE: 149 MMHG

## 2021-09-28 DIAGNOSIS — Z09 POSTOP CHECK: Primary | ICD-10-CM

## 2021-09-28 PROCEDURE — 99024 POSTOP FOLLOW-UP VISIT: CPT | Performed by: OBSTETRICS & GYNECOLOGY

## 2021-09-28 NOTE — PROGRESS NOTES
9/28/2021       HPI:     Name: Yaneth Jasso  YOB: 1950    CC: Yaneth Jasso is a 69 y.o. female who is here for a post op evaluation. HPI: Recently underwent VAGINAL POSTERIOR REPAIR  RETROPUBIC SLING  CYSTOSCOPY  SACRAL COLPOPEXY ROBOTIC .   Voiding difficulty: No  Initiating stream: Yes  Force stream: No  Lean forward to empty: No  Slow/intermittent stream: No  Unable to empty bladder: No  Incontinence: no present prior to surgery  Urgency without incontinence: No   Frequency without incontinence: No present prior to surgery  Bowel functions: normal   Pain Controlled: Yes    Past Medical History:   Past Medical History:   Diagnosis Date    Anemia     Arthritis     Atopic dermatitis     Cystocele, midline     Dermatitis     Diverticulosis     Endometriosis     Essential hypertension     not medically treated    Female bladder prolapse     Glaucoma     Hypothyroidism due to acquired atrophy of thyroid     Patellofemoral disorder of right knee     Polyp of colon, adenomatous     Postmenopausal atrophic vaginitis     Rectocele     SCC (squamous cell carcinoma), face     Skin cancer     Wears glasses      Past Surgical History:   Past Surgical History:   Procedure Laterality Date    BUNIONECTOMY Left     COLPOPEXY N/A 8/16/2021    SACRAL COLPOPEXY ROBOTIC performed by Mayank Hart MD at [facility] 8/16/2021    CYSTOSCOPY performed by Mayank Hart MD at [facility] Bilateral     SLT Laswer eye treatment    HYSTERECTOMY      OSTEOTOMY      FERNANDO AND [address], RMVL tube, RMVL ovary with right oophorectomy    TONSILLECTOMY      URETHRAL SURGERY N/A 8/16/2021    RETROPUBIC SLING performed by Mayank Hart MD at [address] [place] [address] N/A 8/16/2021    VAGINAL POSTERIOR REPAIR performed by Mayank Hart MD at [place] [address]     Current Medications:  Current Outpatient Medications   Medication Sig Dispense Refill    simethicone History:   Social History     Socioeconomic History    Marital status:      Spouse name: Not on file    Number of children: Not on file    Years of education: Not on file    Highest education level: Not on file   Occupational History    Occupation: retired    Tobacco Use    Smoking status: Former Smoker     Packs/day: 1.50     Years: 10.00     Pack years: 15.00     Types: Cigarettes     Quit date: 1980     Years since quittin.7    Smokeless tobacco: Never Used   Vaping Use    Vaping Use: Never used   Substance and Sexual Activity    Alcohol use: Yes     Comment: occaionally    Drug use: Never    Sexual activity: Not Currently     Partners: Male     Comment: due to prolapse and pain   Other Topics Concern    Not on file   Social History Narrative    Not on file     Social Determinants of Health     Financial Resource Strain:     Difficulty of Paying Living Expenses:    Food Insecurity:     Worried About [address] in the Last Year:     [address]  [employer] [address] in the Last Year:    Transportation Needs:     Lack of Transportation (Medical):      Lack of Transportation (Non-Medical):    Physical Activity:     Days of Exercise per Week:     Minutes of Exercise per Session:    Stress:     Feeling of Stress :    Social Connections:     Frequency of Communication with Friends and Family:     Frequency of Social Gatherings with Friends and Family:     Attends Holiness Services:     Active Member of Clubs or Organizations:     Attends Club or Organization Meetings:     Marital Status:    Intimate Partner Violence:     Fear of Current or Ex-Partner:     Emotionally Abused:     Physically Abused:     Sexually Abused:      Family History:   Family History   Problem Relation Age of Onset    Colon Cancer Other     Stroke Other     High Blood Pressure Mother     Mental Illness Mother     High Blood Pressure Father      Review of System   Review of Systems   Constitutional:

## 2021-10-01 ENCOUNTER — TELEPHONE (OUTPATIENT)
Dept: UROGYNECOLOGY | Age: 71
End: 2021-10-01

## 2021-10-01 NOTE — TELEPHONE ENCOUNTER
Pt called to see if she was able to walk at this point in her recovery. Advised that she is cleared to walk, and light running at this point. No lifting greater than 25 pounds.

## 2021-11-10 ENCOUNTER — OFFICE VISIT (OUTPATIENT)
Dept: UROGYNECOLOGY | Age: 71
End: 2021-11-10

## 2021-11-10 VITALS
SYSTOLIC BLOOD PRESSURE: 143 MMHG | RESPIRATION RATE: 14 BRPM | OXYGEN SATURATION: 99 % | DIASTOLIC BLOOD PRESSURE: 59 MMHG | TEMPERATURE: 98.2 F

## 2021-11-10 DIAGNOSIS — Z09 POSTOP CHECK: Primary | ICD-10-CM

## 2021-11-10 PROCEDURE — 99024 POSTOP FOLLOW-UP VISIT: CPT | Performed by: NURSE PRACTITIONER

## 2021-11-10 ASSESSMENT — ENCOUNTER SYMPTOMS
SINUS PRESSURE: 1
ABDOMINAL DISTENTION: 1

## 2021-11-10 NOTE — PROGRESS NOTES
11/10/2021     HPI:     Name: Sushil Caraballo  YOB: 1950    CC: Sushil Caraballo is a 69 y.o. female who is here for a 12 wk post op evaluation.   HPI: Recently underwent on 8/16/21  VAGINAL POSTERIOR REPAIR  RETROPUBIC SLING  CYSTOSCOPY  SACRAL COLPOPEXY ROBOTIC       Voiding difficulty: No  Initiating stream: No  Force stream: No  Lean forward to empty: No  Slow/intermittent stream: Yes, first void of the day is \"slowing moving\"  Unable to empty bladder: No  Incontinence: urge, sometimes occasional drip when trying to make it to the bathroom  Urgency without incontinence: No   Frequency without incontinence: No   Bowel functions: normal, wonders how long she can take her bowel regimen medications  Pain Controlled: Yes    Past Medical History:   Past Medical History:   Diagnosis Date    Anemia     Arthritis     Atopic dermatitis     Cystocele, midline     Dermatitis     Diverticulosis     Endometriosis     Essential hypertension     not medically treated    Female bladder prolapse     Glaucoma     Hypothyroidism due to acquired atrophy of thyroid     Patellofemoral disorder of right knee     Polyp of colon, adenomatous     Postmenopausal atrophic vaginitis     Rectocele     SCC (squamous cell carcinoma), face     Skin cancer     Wears glasses      Past Surgical History:   Past Surgical History:   Procedure Laterality Date    BUNIONECTOMY Left     COLPOPEXY N/A 8/16/2021    SACRAL COLPOPEXY ROBOTIC performed by Db Carmichael MD at [facility] 8/16/2021    CYSTOSCOPY performed by Db Carmichael MD at [place] [address] Bilateral     SLT Laswer eye treatment    HYSTERECTOMY      OSTEOTOMY      FERNANDO AND [address], RMVL tube, RMVL ovary with right oophorectomy    TONSILLECTOMY      URETHRAL SURGERY N/A 8/16/2021    RETROPUBIC SLING performed by Db Carmichael MD at [facility] N/A 8/16/2021    VAGINAL POSTERIOR REPAIR performed by Daryl Saenz MD at [place] [address]     Current Medications:  Current Outpatient Medications   Medication Sig Dispense Refill    pimecrolimus (ELIDEL) 1 % cream Apply topically 2 times daily Apply topically 2 times daily.  Omega-3 Fatty Acids (FISH OIL) 1000 MG CAPS Take 1,000 mg by mouth daily       Vitamin D, Cholecalciferol, 25 MCG (1000 UT) CAPS Take 1 tablet by mouth      SOOLANTRA 1 % CREA daily as needed       nystatin-triamcinolone (MYCOLOG II) 735475-4.1 UNIT/GM-% cream Apply topically daily as needed       ketoconazole (NIZORAL) 2 % cream daily as needed       Ergocalciferol 10 MCG (400 UNIT) TABS Take 1 tablet by mouth daily       conjugated estrogens (PREMARIN) 0.625 MG/GM vaginal cream Apply pea size manually to the vagina. 2 times per week. 42.5 g 3    SYNTHROID 112 MCG tablet Take 112 mcg by mouth Daily       estradiol (VIVELLE) 0.05 MG/24HR Place 1 patch onto the skin Twice a Week       LUMIGAN 0.01 % SOLN ophthalmic drops INSTILL 1 DROP INTO BOTH EYES AT BEDTIME      simethicone (MYLICON) 80 MG chewable tablet Take 1 tablet by mouth 4 times daily as needed for Flatulence (Patient not taking: Reported on 11/10/2021) 180 tablet 3    Estradiol (VAGIFEM) 10 MCG TABS vaginal tablet Place 10 mcg vaginally See Admin Instructions Two times per week -Tuesday  and Friday (Patient not taking: Reported on 11/10/2021)       No current facility-administered medications for this visit. Allergies:    Allergies   Allergen Reactions    Latex Itching and Rash    Bacitracin      Other reaction(s): Hives    Other     Adhesive Tape Itching and Rash     Medical tape    Other reaction(s): Hives    Amoxicillin Nausea Only    Corn Oil Other (See Comments)     Pt unable to recall reaction    Cornsilk Other (See Comments)     Pt unable to recall reaction    Metronidazole Rash    Neomycin Rash     Other reaction(s): Hives    Polymyxin B Rash     Other reaction(s): Hives    Soy Allergy Other (See Comments)     Pt unable to recall reaction     Social History:   Social History     Socioeconomic History    Marital status:      Spouse name: Not on file    Number of children: Not on file    Years of education: Not on file    Highest education level: Not on file   Occupational History    Occupation: retired    Tobacco Use    Smoking status: Former Smoker     Packs/day: 1.50     Years: 10.00     Pack years: 15.00     Types: Cigarettes     Quit date: 1980     Years since quittin.8    Smokeless tobacco: Never Used   Vaping Use    Vaping Use: Never used   Substance and Sexual Activity    Alcohol use: Yes     Comment: occaionally    Drug use: Never    Sexual activity: Not Currently     Partners: Male     Comment: due to prolapse and pain   Other Topics Concern    Not on file   Social History Narrative    Not on file     Social Determinants of Health     Financial Resource Strain:     Difficulty of Paying Living Expenses: Not on file   Food Insecurity:     Worried About [address] in the Last Year: Not on file    [address] in the Last Year: Not on file   Transportation Needs:     Lack of Transportation (Medical): Not on file    Lack of Transportation (Non-Medical):  Not on file   Physical Activity:     Days of Exercise per Week: Not on file    Minutes of Exercise per Session: Not on file   Stress:     Feeling of Stress : Not on file   Social Connections:     Frequency of Communication with Friends and Family: Not on file    Frequency of Social Gatherings with Friends and Family: Not on file    Attends Congregational Services: Not on file    Active Member of Clubs or Organizations: Not on file    Attends Club or Organization Meetings: Not on file    Marital Status: Not on file   Intimate Partner Violence:     Fear of Current or Ex-Partner: Not on file    Emotionally Abused: Not on file    Physically Abused: Not on file    Sexually Abused: Not on file   Housing Stability:     Unable to Pay for Housing in the Last Year: Not on file    Number of Places Lived in the Last Year: Not on file    Unstable Housing in the Last Year: Not on file     Family History:   Family History   Problem Relation Age of Onset    Colon Cancer Other     Stroke Other     High Blood Pressure Mother     Mental Illness Mother     High Blood Pressure Father      Review of System   Review of Systems   HENT: Positive for congestion, sinus pressure and tinnitus. Eyes: Positive for visual disturbance. Gastrointestinal: Positive for abdominal distention. Allergic/Immunologic: Positive for environmental allergies. All other systems reviewed and are negative. A review of systems was done by the patient and reviewed by me and scanned into media today. Objective:     Vitals  Vitals:    11/10/21 0952   BP: (!) 143/59   Resp: 14   Temp: 98.2 °F (36.8 °C)   SpO2: 99%     Physical Exam  Physical Exam  Vitals and nursing note reviewed. Constitutional:       Appearance: Normal appearance. HENT:      Head: Normocephalic. Eyes:      Conjunctiva/sclera: Conjunctivae normal.   Cardiovascular:      Rate and Rhythm: Normal rate. Pulmonary:      Effort: Pulmonary effort is normal.   Musculoskeletal:         General: Normal range of motion. Cervical back: Normal range of motion. Skin:     General: Skin is warm and dry. Neurological:      General: No focal deficit present. Mental Status: She is alert. Psychiatric:         Mood and Affect: Mood normal.         Behavior: Behavior normal.       All surgical incisions healing well. No erythema. No evidence of hematoma or abscess. Well supported    No results found for this visit on 11/10/21. Assessment/Plan:     Toñito Rodriguez is a 69 y.o. female with No diagnosis found. Patient is doing well 12 weeks.    All questions answered, restrictions lifted  She will continue to use her topical estrogen cream and may obtain refills

## 2024-05-14 NOTE — TELEPHONE ENCOUNTER
Breast Surgery New Patient Consultation    This is the first visit for this 63 year old woman, referred by Dr. Herring, who presents for evaluation of right breast cyst.    History of Present Illness:   Ms. Shira Washington is a 63 year old woman who presents with concerns related to the right breast cyst.  The patient denies any palpable masses, nipple discharge, skin changes or axillary symptoms.  She denies any breast pain.  She has no personal prior history of breast disease or biopsies.  She does not have any known family history of breast cancer.  She had her screening mammogram in 2023 and was found to have scattered densities with a 6 mm asymmetric density seen in the right breast for which additional imaging was recommended.  This took place on May 9, 2024 for surveillance purposes confirming a stable 8 mm cyst with a simple appearance on current imaging.  She is here today for evaluation and recommendations for further therapy.        Past Medical History:    Diverticulitis    Rare diverticular disease- per colonoscopy    Iron deficiency anemia    Polyp of colon, adenomatous    ascending, tubular adenoma- no high grade dysplasia seen    Sebaceous cyst of skin of left breast       Past Surgical History:   Procedure Laterality Date    Colonoscopy  2010    Colonoscopy  2015    Diverticular disease, Int hemorrhoids- [facility]    D & c      Upper gi endoscopy - referral  2010       Gynecological History:  Pt is a   She achieved menarche at age 18 and LMP age 49  Age of Menopause: 49  Type: natural menopause  She denies any history of hormone replacement therapy  She has history of oral contraceptive use for 20 years.  She denies infertility treatment to achieve pregnancy.    Medications:    No outpatient medications have been marked as taking for the 24 encounter (Appointment) with Marie Villa MD.       Allergies:    Allergies   Allergen Reactions    Aspirin  Post Surgery and ER Follow up        Pain- Taking Ibuprofen as prescribed by Dr. Jyoti Sy. Patient denies any complaints at this time. Pt educated she could continue her Percocet PRN for pain if needed. Bowel Movements-  Taking Miralax, colace, and metamucil as prescribed by Dr Jyoti Sy. Last BM 8/20/21. Pt states ER told her she was constipated. Pt educated to continue Miralax 1 capful a day, due to, having a BM this am.  Do not increase Miralax at this time. Pt to call this office if she does not have a BM every day. Pt verbalized understanding. Urinary-  Denies any complaints at this time. Vaginal area- Vaginal bleeding is decreasing everyday. Denies any complaints at this time. Pt states she went to ER yesterday. No DVT noted, EKG normal per pt. NAUSEA ONLY       Family History:   Family History   Problem Relation Age of Onset    Heart Attack Father 63         massive MI    High Blood Pressure Father     High Blood Pressure Mother 64         pancreatic ca    Arthritis Mother     Colon Cancer Mother     High Blood Pressure Sister     High Blood Pressure Brother     Diabetes Neg        She is not of Ashkenazi Mandaeism ancestry.    Social History:  History   Alcohol Use Not Currently     Comment: socially       History   Smoking Status    Never   Smokeless Tobacco    Never     Ms. Shira Washington is Single with 0 children. She has 2 siblings. She is currently Employed full-time      Review of Systems:  General:   The patient denies, fever, chills, night sweats, fatigue, generalized weakness, change in appetite or weight loss.    HEENT:     The patient denies eye irritation, cataracts, redness, glaucoma, yellowing of the eyes, change in vision, color blindness, or wearing contacts/glasses. The patient denies hearing loss, ringing in the ears, ear drainage, earaches, nasal congestion, nose bleeds, snoring, pain in mouth/throat, hoarseness, change in voice, facial trauma.    Respiratory:  The patient denies chronic cough, phlegm, hemoptysis, pleurisy/chest pain, pneumonia, asthma, wheezing, difficulty in breathing with exertion, emphysema, chronic bronchitis, shortness of breath or abnormal sound when breathing.     Cardiovascular:  There is no history of chest pain, chest pressure/discomfort, palpitations, irregular heartbeat, fainting or near-fainting, difficulty breathing when lying flat, SOB/Coughing at night, swelling of the legs or chest pain while walking.    Breasts:  See history of present illness    Gastrointestinal:     There is no history of difficulty or pain with swallowing, reflux symptoms, vomiting, dark or bloody stools, constipation, yellowing of the skin, indigestion, nausea, change in bowel habits, diarrhea, abdominal pain or vomiting  blood.     Genitourinary:  The patient denies frequent urination, needing to get up at night to urinate, urinary hesitancy or retaining urine, painful urination, urinary incontinence, decreased urine stream, blood in the urine or vaginal/penile discharge.    Skin:    The patient denies rash, itching, skin lesions, dry skin, change in skin color or change in moles.     Hematologic/Lymphatic:  The patient denies easily bruising or bleeding or persistent swollen glands or lymph nodes.     Musculoskeletal:  The patient denies muscle aches/pain, joint pain, stiff joints, neck pain, back pain or bone pain.    Neuropsychiatric:  There is no history of migraines or severe headaches, seizure/epilepsy, speech problems, coordination problems, trembling/tremors, fainting/black outs, dizziness, memory problems, loss of sensation/numbness, problems walking, weakness, tingling or burning in hands/feet. There is no history of abusive relationship, bipolar disorder, sleep disturbance, anxiety, depression or feeling of despair.    Endocrine:    There is no history of poor/slow wound healing, weight loss/gain, fertility or hormone problems, cold intolerance, thyroid disease.     Allergic/Immunologic:  There is no history of hives, hay fever, angioedema or anaphylaxis.    /90 (BP Location: Right arm, Patient Position: Sitting, Cuff Size: adult)   Pulse 70   Temp 98.1 °F (36.7 °C) (Temporal)   Resp 17   Ht 1.626 m (5' 4\")   Wt 78.5 kg (173 lb)   LMP 11/03/2015 (LMP Unknown)   SpO2 98%   BMI 29.70 kg/m²     Physical Exam:  The patient is an alert, oriented, well-nourished and  well-developed woman who appears her stated age. Her speech patterns and movements are normal. Her affect is appropriate.    HEENT: The head is normocephalic. The neck is supple. The thyroid is not enlarged and is without palpable masses/nodules. There are no palpable masses. The trachea is in the midline. Conjunctiva are clear, non-icteric.    Chest:  The chest expands symmetrically. The lungs are clear to auscultation.    Heart: The rhythm is regular.  There are no murmurs, rubs, gallops or thrills.    Breasts:  Her breasts are symmetrical with a cup size 40D.  Right breast: The skin, nipple ,and areola appear normal. There is no skin dimpling with movement of the pectoralis. There is no nipple retraction. No nipple discharge can be elicited. The parenchyma is mildly nodular. There are no dominant masses in the breast. The axillary tail is normal.  Left breast:   The skin, nipple, and areola appear normal. There is no skin dimpling with movement of the pectoralis. There is no nipple retraction. No nipple discharge can be elicited. The parenchyma is mildly nodular. There are no dominant masses in the breast. The axillary tail is normal.    Abdomen:  The abdomen is soft, flat and non tender. The liver is not enlarged. There are no palpable masses.    Lymph Nodes:  The supraclavicular, axillary and cervical regions are free of significant lymphadenopathy.    Back: There is no vertebral column tenderness.    Skin: The skin appears normal. There are no suspicious appearing rashes or lesions.    Extremities: The extremities are without deformity, cyanosis or edema.    Impression:   Ms. Shira Washington is a 63 year old woman presents with an imaging detected right breast cyst.    Discussion and Plan:  I had a discussion with the Patient regarding her breast exam. On exam today I found no clinical evidence of [place] bilaterally.  I first reviewed the recent imaging we discussed this at length.    I explained that simple cysts are a common finding in women and that no specific intervention is universally warranted. I did explain that if a cyst enlarges or becomes focally symptomatic, occasionally interventions such as aspiration may be performed for transient relief of symptoms. I discussed that the relief is transient as the wall of the cyst maintains its integrity and  therefore fluid tends to re-accumulate within the cyst within the next cycle. Excision of cysts is not recommended as the risk of the procedure is generally greater than the benefit of excising the cyst. In addition, simple cysts are not known to be precursor lesions and therefore there is no increased risk for the development of breast cancer in the future.     Her cyst is asymptomatic and too small for aspiration and therefore we will plan to resume her regular bilateral screening surveillance in November 2024. She was given ample opportunity for questions and those questions were answered to her satisfaction. She has been  encouraged to contact the office with any questions or concerns as needed related to her breast health.    This note was created by Dragon voice recognition. Errors in content may be related to improper recognition by the system; efforts to review and correct have been done but errors may still exist. Please be advised the primary purpose of this note is for me to communicate medical care. Standard sentence structure is not always used. Medical terminology and medical abbreviations may be used. There may be grammatical, typographical, and automated fill ins that may have errors missed in proofreading.

## 2024-09-05 NOTE — TELEPHONE ENCOUNTER
Surgery scheduled for 8/16/21. Left a message for patient if she has any additional questions prior to surgery. Medications to Hold morning of surgery per Dr Arsen Dunbar:   Nystatin cream, Ketoconazole cream, premarin cream and spironolactone. Patient to continue Holding All vitamins, herbal supplements, supplements, NSAIDS, and Asprin products.
Detail Level: Detailed
General Sunscreen Counseling: I recommended a broad spectrum sunscreen with a SPF of 30 or higher.  I explained that SPF 30 sunscreens block approximately 97 percent of the sun's harmful rays.  Sunscreens should be applied at least 15 minutes prior to expected sun exposure and then every 2 hours after that as long as sun exposure continues. If swimming or exercising sunscreen should be reapplied every 45 minutes to an hour after getting wet or sweating. I also recommended a lip balm with a sunscreen as well. Sun protective clothing can be used in lieu of sunscreen but must be worn the entire time you are exposed to the sun's rays.

## 2025-02-18 ENCOUNTER — OFFICE (OUTPATIENT)
Dept: URBAN - METROPOLITAN AREA PATHOLOGY 1 | Facility: PATHOLOGY | Age: 75
End: 2025-02-18
Payer: COMMERCIAL

## 2025-02-18 ENCOUNTER — AMBULATORY SURGICAL CENTER (OUTPATIENT)
Dept: URBAN - METROPOLITAN AREA SURGERY 7 | Facility: SURGERY | Age: 75
End: 2025-02-18
Payer: COMMERCIAL

## 2025-02-18 VITALS
SYSTOLIC BLOOD PRESSURE: 133 MMHG | DIASTOLIC BLOOD PRESSURE: 70 MMHG | SYSTOLIC BLOOD PRESSURE: 149 MMHG | DIASTOLIC BLOOD PRESSURE: 78 MMHG | WEIGHT: 110 LBS | SYSTOLIC BLOOD PRESSURE: 136 MMHG | HEART RATE: 59 BPM | RESPIRATION RATE: 14 BRPM | SYSTOLIC BLOOD PRESSURE: 163 MMHG | SYSTOLIC BLOOD PRESSURE: 150 MMHG | RESPIRATION RATE: 32 BRPM | HEART RATE: 57 BPM | DIASTOLIC BLOOD PRESSURE: 76 MMHG | HEART RATE: 52 BPM | TEMPERATURE: 97.8 F | HEIGHT: 57 IN | OXYGEN SATURATION: 97 % | DIASTOLIC BLOOD PRESSURE: 62 MMHG | RESPIRATION RATE: 10 BRPM | OXYGEN SATURATION: 99 % | SYSTOLIC BLOOD PRESSURE: 151 MMHG | HEART RATE: 56 BPM | RESPIRATION RATE: 17 BRPM | RESPIRATION RATE: 16 BRPM | DIASTOLIC BLOOD PRESSURE: 78 MMHG | HEART RATE: 66 BPM | RESPIRATION RATE: 14 BRPM | DIASTOLIC BLOOD PRESSURE: 64 MMHG | HEART RATE: 57 BPM | SYSTOLIC BLOOD PRESSURE: 126 MMHG | DIASTOLIC BLOOD PRESSURE: 62 MMHG | SYSTOLIC BLOOD PRESSURE: 143 MMHG | HEIGHT: 57 IN | RESPIRATION RATE: 7 BRPM | SYSTOLIC BLOOD PRESSURE: 151 MMHG | HEART RATE: 58 BPM | HEART RATE: 52 BPM | SYSTOLIC BLOOD PRESSURE: 136 MMHG | OXYGEN SATURATION: 99 % | OXYGEN SATURATION: 100 % | HEART RATE: 56 BPM | OXYGEN SATURATION: 97 % | SYSTOLIC BLOOD PRESSURE: 160 MMHG | HEART RATE: 66 BPM | DIASTOLIC BLOOD PRESSURE: 64 MMHG | RESPIRATION RATE: 16 BRPM | DIASTOLIC BLOOD PRESSURE: 60 MMHG | SYSTOLIC BLOOD PRESSURE: 143 MMHG | RESPIRATION RATE: 10 BRPM | OXYGEN SATURATION: 98 % | SYSTOLIC BLOOD PRESSURE: 150 MMHG | DIASTOLIC BLOOD PRESSURE: 88 MMHG | OXYGEN SATURATION: 98 % | DIASTOLIC BLOOD PRESSURE: 60 MMHG | SYSTOLIC BLOOD PRESSURE: 133 MMHG | OXYGEN SATURATION: 100 % | HEART RATE: 60 BPM | SYSTOLIC BLOOD PRESSURE: 160 MMHG | HEART RATE: 60 BPM | SYSTOLIC BLOOD PRESSURE: 163 MMHG | HEART RATE: 58 BPM | SYSTOLIC BLOOD PRESSURE: 126 MMHG | DIASTOLIC BLOOD PRESSURE: 88 MMHG | DIASTOLIC BLOOD PRESSURE: 70 MMHG | DIASTOLIC BLOOD PRESSURE: 76 MMHG | WEIGHT: 110 LBS | RESPIRATION RATE: 32 BRPM | RESPIRATION RATE: 7 BRPM | HEART RATE: 59 BPM | RESPIRATION RATE: 17 BRPM | SYSTOLIC BLOOD PRESSURE: 149 MMHG | TEMPERATURE: 97.8 F

## 2025-02-18 DIAGNOSIS — D12.3 BENIGN NEOPLASM OF TRANSVERSE COLON: ICD-10-CM

## 2025-02-18 DIAGNOSIS — Z86.0101 PERSONAL HISTORY OF ADENOMATOUS AND SERRATED COLON POLYPS: ICD-10-CM

## 2025-02-18 DIAGNOSIS — Z09 ENCOUNTER FOR FOLLOW-UP EXAMINATION AFTER COMPLETED TREATMEN: ICD-10-CM

## 2025-02-18 DIAGNOSIS — K57.30 DIVERTICULOSIS OF LARGE INTESTINE WITHOUT PERFORATION OR ABS: ICD-10-CM

## 2025-02-18 DIAGNOSIS — K63.5 POLYP OF COLON: ICD-10-CM

## 2025-02-18 DIAGNOSIS — Z80.0 FAMILY HISTORY OF MALIGNANT NEOPLASM OF DIGESTIVE ORGANS: ICD-10-CM

## 2025-02-18 PROBLEM — Z86.010 SURVEILLANCE DUE TO PRIOR COLONIC NEOPLASIA: Status: ACTIVE | Noted: 2025-02-18

## 2025-02-18 PROCEDURE — 88305 TISSUE EXAM BY PATHOLOGIST: CPT | Performed by: PATHOLOGY

## 2025-02-18 PROCEDURE — 45385 COLONOSCOPY W/LESION REMOVAL: CPT | Mod: PT | Performed by: INTERNAL MEDICINE

## 2025-02-21 LAB
PDF: PDF REPORT: (no result)
PDF: PDF REPORT: (no result)

## (undated) DEVICE — TRI-LUMEN FILTERED TUBE SET WITH ACTIVATED CHARCOAL FILTER: Brand: AIRSEAL

## (undated) DEVICE — BASIC SINGLE BASIN 1-LF: Brand: MEDLINE INDUSTRIES, INC.

## (undated) DEVICE — GAUZE,PACKING STRIP,IODOFORM,2"X5YD,STRL: Brand: CURAD

## (undated) DEVICE — CYSTO/BLADDER IRRIGATION SET, REGULATING CLAMP

## (undated) DEVICE — CANNULA SEAL

## (undated) DEVICE — Z DISCONTINUED BY MEDLINE USE 2711682 TRAY SKIN PREP DRY W/ PREM GLV

## (undated) DEVICE — COUNTER NDL 40 COUNT HLD 70 NUM FOAM BLK SGL MAG W BLDE REMV

## (undated) DEVICE — SUTURE VCRL SZ 4-0 L27IN ABSRB UD L19MM PS-2 3/8 CIR PRIM J426H

## (undated) DEVICE — ARM DRAPE

## (undated) DEVICE — PAD SANITARY MTRN TAB BELT WRP NS 11IN

## (undated) DEVICE — NEEDLE ENDOSCP 1ML SYR CA HA SIDEKCK RIG INJ COAPTITE

## (undated) DEVICE — INSUFFLATION NEEDLE TO ESTABLISH PNEUMOPERITONEUM.: Brand: INSUFFLATION NEEDLE

## (undated) DEVICE — NEEDLE HYPO 23GA L1.5IN TURQ POLYPR HUB S STL THN WALL IM

## (undated) DEVICE — 1016 S-DRAPE IRRIG POUCH 10/BOX: Brand: STERI-DRAPE™

## (undated) DEVICE — SOLUTION SCRB 4OZ 10% POVIDONE IOD ANTIMIC BTL

## (undated) DEVICE — TIP COVER ACCESSORY

## (undated) DEVICE — DRAPE LAP 104X35X124IN BLU CTRL + FAB REINF ABD FEN

## (undated) DEVICE — SYRINGE 20ML LL S/C 50

## (undated) DEVICE — GAUZE,PACKING STRIP,PLAIN,2"X5YD,STRL,LF: Brand: CURAD

## (undated) DEVICE — ELECTRODE PT RET AD L9FT HI MOIST COND ADH HYDRGEL CORDED

## (undated) DEVICE — JELLY LUBRICATING 2.7GM H2O SOL GREASELESS E-Z

## (undated) DEVICE — SYRINGE MED 10ML SLIP TIP BLNT FILL AND LUERLOCK DISP

## (undated) DEVICE — SYRINGE IRRIG 60ML SFT PLIABLE BLB EZ TO GRP 1 HND USE W/

## (undated) DEVICE — GAUZE,SPONGE,2"X2",8PLY,STERILE,LF,2'S: Brand: MEDLINE

## (undated) DEVICE — SUTURE VCRL SZ 2-0 L18IN ABSRB VLT L26MM SH 1/2 CIR J775D

## (undated) DEVICE — LEGGINGS, PAIR, CLEAR, STERILE: Brand: MEDLINE

## (undated) DEVICE — 30977 SEE SHARP - ENHANCED INTRAOPERATIVE LAPAROSCOPE CLEANING & DEFOGGING: Brand: 30977 SEE SHARP - ENHANCED INTRAOPERATIVE LAPAROSCOPE CLEANING & DEFOGGING

## (undated) DEVICE — BAG DRAINAGE FOR SIEMANS DORNIER

## (undated) DEVICE — SOLUTION IV IRRIG POUR BRL 0.9% SODIUM CHL 2F7124

## (undated) DEVICE — LAPAROSCOPY PK

## (undated) DEVICE — STRIP,CLOSURE,WOUND,MEDI-STRIP,1/4X3: Brand: MEDLINE

## (undated) DEVICE — SUTURE VCRL SZ 2-0 L27IN ABSRB UD L26MM SH 1/2 CIR J417H

## (undated) DEVICE — 3M™ STERI-DRAPE™ INCISE DRAPE 1050 (60CM X 45CM): Brand: STERI-DRAPE™

## (undated) DEVICE — TOTAL TRAY, 16FR 10ML SIL FOLEY, URN: Brand: MEDLINE

## (undated) DEVICE — ADHESIVE SKIN CLOSURE TOP 36 CC HI VISC DERMBND MINI

## (undated) DEVICE — MERCY HEALTH WEST TURNOVER: Brand: MEDLINE INDUSTRIES, INC.

## (undated) DEVICE — TOWEL,OR,DSP,ST,BLUE,STD,4/PK,20PK/CS: Brand: MEDLINE

## (undated) DEVICE — GLOVE SURG SZ 7.5 L11.73IN FNGR THK9.8MIL STRW LTX POLYMER

## (undated) DEVICE — APPLICATOR MEDICATED 26 CC SOLUTION HI LT ORNG CHLORAPREP

## (undated) DEVICE — SYSTEM SMK EVAC LAP TBNG FILTER HSNG BENT STYL PNK SEE CLR

## (undated) DEVICE — SUTURE PDS II SZ 2-0 L27IN ABSRB VLT L26MM CT-2 1/2 CIR Z333H

## (undated) DEVICE — AIRSEAL 8 MM ACCESS PORT AND LOW PROFILE OBTURATOR WITH BLADELESS OPTICAL TIP, 120 MM LENGTH: Brand: AIRSEAL

## (undated) DEVICE — SUTURE PROL SZ 2-0 L30IN NONABSORBABLE BLU L26MM SH 1/2 CIR 8833H

## (undated) DEVICE — SKIN MARKER REGULAR TIP WITH RULER CAP AND LABELS: Brand: DEVON

## (undated) DEVICE — 3M™ TEGADERM™ TRANSPARENT FILM DRESSING FRAME STYLE, 1624W, 2-3/8 IN X 2-3/4 IN (6 CM X 7 CM), 100/CT 4CT/CASE: Brand: 3M™ TEGADERM™

## (undated) DEVICE — SOLUTION PREP POVIDONE IOD FOR SKIN MUCOUS MEM PRIOR TO

## (undated) DEVICE — GOWN SIRUS NONREIN XL W/TWL: Brand: MEDLINE INDUSTRIES, INC.

## (undated) DEVICE — SYRINGE MED 10ML LUERLOCK TIP W/O SFTY DISP

## (undated) DEVICE — SUTURE VCRL SZ 3-0 L27IN ABSRB UD L26MM SH 1/2 CIR J416H

## (undated) DEVICE — SUTURE VCRL SZ 0 L18IN ABSRB VLT L26MM CT-2 1/2 CIR J727D

## (undated) DEVICE — PAD,NON-ADHERENT,3X8,STERILE,LF,1/PK: Brand: MEDLINE

## (undated) DEVICE — ELECTRO LUBE IS A SINGLE PATIENT USE DEVICE THAT IS INTENDED TO BE USED ON ELECTROSURGICAL ELECTRODES TO REDUCE STICKING.: Brand: KEY SURGICAL ELECTRO LUBE

## (undated) DEVICE — SUTURE VCRL SZ 2-0 L18IN ABSRB UD POLYGLACTIN 910 BRAID TIE J111T

## (undated) DEVICE — DRAPE,UNDERBUTTOCKS,PCH,STERILE: Brand: MEDLINE

## (undated) DEVICE — SUTURE VCRL SZ 4-0 L18IN ABSRB UD L19MM PS-2 3/8 CIR PRIM J496H

## (undated) DEVICE — MAJOR SET UP: Brand: MEDLINE INDUSTRIES, INC.

## (undated) DEVICE — Device

## (undated) DEVICE — SUTURE VCRL SZ 4-0 L27IN ABSRB UD L19MM FS-2 3/8 CIR REV J422H

## (undated) DEVICE — SOLUTION IV IRRIG WATER 1000ML POUR BRL 2F7114

## (undated) DEVICE — NEEDLE SPNL 22GA L2.5IN BLK QNCKE BVL DISP

## (undated) DEVICE — BLADELESS OBTURATOR: Brand: WECK VISTA

## (undated) DEVICE — COVER,TABLE,77X90,STERILE: Brand: MEDLINE